# Patient Record
Sex: FEMALE | Race: BLACK OR AFRICAN AMERICAN | NOT HISPANIC OR LATINO | Employment: FULL TIME | URBAN - METROPOLITAN AREA
[De-identification: names, ages, dates, MRNs, and addresses within clinical notes are randomized per-mention and may not be internally consistent; named-entity substitution may affect disease eponyms.]

---

## 2022-11-18 ENCOUNTER — TELEPHONE (OUTPATIENT)
Dept: INTERNAL MEDICINE | Facility: CLINIC | Age: 32
End: 2022-11-18

## 2022-11-18 NOTE — TELEPHONE ENCOUNTER
Caller: Digna Ibarra    Relationship to patient: Self    Best call back number: [phone] (Home)    Patient directed to call 911 or go to their nearest emergency room.     Patient verbalized understanding: [x] Yes  [] No  If no, why?    Additional notes: PATIENT JUST CALLED REQUESTING A SAME DAY APPOINTMENT I ADVISED HER TO BE SEEN AT THE EMERGENCY ROOM HER SYMPTOMS ARE LEFT SIDE OF FACE NUMB, LEFT SIDE OF HEART PAIN, HEADACHE

## 2023-02-27 ENCOUNTER — TELEPHONE (OUTPATIENT)
Dept: INTERNAL MEDICINE | Facility: CLINIC | Age: 33
End: 2023-02-27

## 2023-02-27 NOTE — TELEPHONE ENCOUNTER
Caller: Digna Ibarra    Relationship to patient: Self    Best call back number: [phone]    Chief complaint: PATIENT CALLED STATING THAT SHE IS STILL HAVING ABDOMINAL PAIN AND FATIGUE. SHE STATES HER FATIGUE IS GETTING WORSE THEN IT WAS WHEN SHE WAS SEEN LAST WEEK, THAT WHEN IT HITS SHE HAS TO LAY DOWN. SHE IS ALSO EXPERIENCING LOSS OF VISION TODAY-IT WILL GO OUT AND THEN COME BACK IN OR IMAGES WILL BE EXTREMELY BLURRY AND THEN FIX ITSELF. PATIENT WAS ADVISED TO GO TO THE ER, PATIENT AGREED TO GO.     Patient directed to call 911 or go to their nearest emergency room.     Patient verbalized understanding: [x] Yes  [] No  If no, why?

## 2023-03-13 ENCOUNTER — TELEPHONE (OUTPATIENT)
Dept: NEUROLOGY | Facility: CLINIC | Age: 33
End: 2023-03-13

## 2023-03-13 NOTE — TELEPHONE ENCOUNTER
Provider: LIZA JOHNSON   Caller: AZEEM  Relationship to Patient: SELF   Phone Number: [phone]  Reason for Call: PATIENT CALLED STATES SHE HAD A CONSULT WITH LIZA WHILE IN THE HOSPITAL. AT THAT TIME LIZA GAVE PATIENT HER CARD AND TOLD HER SHE COULD CALL HER IF SHE HAD ANY CONCERNS OR WAS CONTINUING TO EXPERIENCE ANY CONCERNING SYMPTOMS. AT THIS TIME PATIENT IS REQUESTING A CALL BACK FROM LIZA AT HER EARLIEST CONVENIENCE     THANK YOU

## 2023-06-16 ENCOUNTER — TELEPHONE (OUTPATIENT)
Dept: NEUROLOGY | Facility: CLINIC | Age: 33
End: 2023-06-16

## 2023-06-16 NOTE — TELEPHONE ENCOUNTER
Provider: FADY  Caller: AZEEM  Phone Number: [phone]  Reason for Call: PT CALLED TO TRY TO GET A SOONER APPT TO SEE PROVIDER SOONER THAN 12/12/23 AS SHE IS HAVING MIGRAINE ATTACKS. PT STATES THAT SHE HAD BAD MIGRAINES THIS WEEK BUT DID NOT GO TO THE HOSPITAL. PT STATES THAT THE MIGRAINE MADE HER NECK AND LEFT SIDE OF BODY TINSE UP. PT STATES THIS LASTED FOR 2-3 HOURS UPON WAKE IT WAS GONE. PT WANTS TO KNOW IF THERE IS ANYTHING ELSE THAT SHE CAN TAKE OR DO FOR MIGRAINES. PT STATES THAT SHE STOPPED TAKING NAPROXEN DUE TO CHEST PAINS AND CAUSING MORE MIGRAINES AND PT IS AFRAID TO TAKE TOPAMAX AS SHE CAN NOT JUST STOP TAKING IT IF SHE HAS SIDE EFFECTS.    PLEASE REVIEW AND ADVISE.  THANK YOU

## 2023-06-19 NOTE — TELEPHONE ENCOUNTER
LVM FOR PT. SCHEDULED PT FOR 6/21/23 AT 1. DR. SHRESTHA HAD CANCELLATION. PLEASE LET PT KNOW WHEN SHE CALLS BACK IF THIS DOES NOT WORK PLEASE LET ME KNOW AND I WILL LOOK FOR ANOTHER APT. FOR SOONER. KEPT THE DECEMBER APT FOR NOW. THANK YOU.

## 2023-06-21 NOTE — TELEPHONE ENCOUNTER
Caller: Digna Ibarra    Relationship: Self    Best call back number: [phone]    What is the best time to reach you: ANY    Who are you requesting to speak with (clinical staff, provider,  specific staff member): BARI    Do you know the name of the person who called: BARI    What was the call regarding: UNABLE TO MAKE APPT ON 6/21/23 DUE TO WORK. WOULD LIKE TO SEE ABOUT ANOTHER APPT.    HUB CANCELED 6/21/23 & LEFT APPT FOR DECEMBER.

## 2023-10-23 DIAGNOSIS — R07.9 CHEST PAIN DUE TO GERD: Primary | ICD-10-CM

## 2023-10-23 DIAGNOSIS — K21.9 CHEST PAIN DUE TO GERD: Primary | ICD-10-CM

## 2023-11-16 ENCOUNTER — OFFICE VISIT (OUTPATIENT)
Dept: FAMILY MEDICINE CLINIC | Facility: CLINIC | Age: 33
End: 2023-11-16
Payer: COMMERCIAL

## 2023-11-16 VITALS
BODY MASS INDEX: 24.63 KG/M2 | DIASTOLIC BLOOD PRESSURE: 68 MMHG | SYSTOLIC BLOOD PRESSURE: 112 MMHG | HEIGHT: 63 IN | HEART RATE: 71 BPM | OXYGEN SATURATION: 95 % | WEIGHT: 139 LBS | TEMPERATURE: 98.2 F

## 2023-11-16 DIAGNOSIS — R53.83 OTHER FATIGUE: ICD-10-CM

## 2023-11-16 DIAGNOSIS — R42 DIZZINESS: ICD-10-CM

## 2023-11-16 DIAGNOSIS — E55.9 VITAMIN D DEFICIENCY: ICD-10-CM

## 2023-11-16 DIAGNOSIS — Z13.220 SCREENING CHOLESTEROL LEVEL: ICD-10-CM

## 2023-11-16 DIAGNOSIS — Z00.00 ROUTINE GENERAL MEDICAL EXAMINATION AT A HEALTH CARE FACILITY: Primary | ICD-10-CM

## 2023-11-16 DIAGNOSIS — R07.9 CHEST PAIN, UNSPECIFIED TYPE: ICD-10-CM

## 2023-11-16 DIAGNOSIS — L98.9 SCALP LESION: ICD-10-CM

## 2023-11-16 PROCEDURE — 99395 PREV VISIT EST AGE 18-39: CPT | Performed by: FAMILY MEDICINE

## 2023-11-16 PROCEDURE — 93000 ELECTROCARDIOGRAM COMPLETE: CPT | Performed by: FAMILY MEDICINE

## 2023-11-16 NOTE — PROGRESS NOTES
Chief Complaint  physial    Subjective            Digna Valerio presents to [facility]  History of Present Illness    Patient reporting that health is doing well overall.  Patient is here today for annual physical.  Trying to eat a balanced diet. States not getting to exercise much at all.    States she has notice some chills, dizziness,and fatigue.   Went to clinic two days ago really bad, state felt like going to pass out.   Still feeling bad but not as bad as Tuesday.    No sick contacts.     States still having some breast pain, states comes randomly throughout the day. States not associated with exercise or activity.  Just had a pain while driving to appt today. No shortness of breath has not used albuterol since September.           Labs: Due for routine labs    Colorectal cancer screening:not due at this time   Breast cancer screening: not due at this time   Cervical cancer screening: up to date.     Immunization History   Administered Date(s) Administered    COVID-19 (PFIZER) Purple Cap Monovalent 10/27/2021, 11/19/2021      Patient has seen dentist within last 6 months  needs to make appt   Patient has seen eye specialist within last 6 months need to schedule, vision fine    PHQ-2 Depression Screening  Little interest or pleasure in doing things? 0-->not at all   Feeling down, depressed, or hopeless? 0-->not at all   PHQ-2 Total Score 0      Social History     Socioeconomic History    Marital status:     Number of children: 0   Tobacco Use    Smoking status: Never    Smokeless tobacco: Never    Tobacco comments:     Caffeine use minimal   Vaping Use    Vaping Use: Never used   Substance and Sexual Activity    Alcohol use: Never    Drug use: Defer    Sexual activity: Defer          Review of Systems   Constitutional:  Positive for chills and fatigue.   HENT:  Negative for congestion, rhinorrhea and sneezing.    Respiratory:  Negative for shortness of breath and  "wheezing.         Chest pain, breast pain   Cardiovascular:  Negative for chest pain, palpitations and leg swelling.   Gastrointestinal:  Positive for abdominal pain. Negative for constipation, diarrhea, nausea and vomiting.   Genitourinary:  Negative for difficulty urinating.   Musculoskeletal: Negative.    Skin:         Soft, nontender lesion scalp close to base of head   Neurological:  Positive for dizziness.         Objective   Vital Signs:   /68   Pulse 71   Temp 98.2 °F (36.8 °C)   Ht 160 cm (63\")   Wt 63 kg (139 lb)   SpO2 95%   BMI 24.62 kg/m²     Physical Exam  Constitutional:       General: She is not in acute distress.  HENT:      Head: Normocephalic.      Right Ear: Tympanic membrane normal.      Left Ear: Tympanic membrane normal.      Mouth/Throat:      Mouth: Mucous membranes are moist.   Eyes:      Conjunctiva/sclera: Conjunctivae normal.   Cardiovascular:      Rate and Rhythm: Regular rhythm.      Heart sounds: Normal heart sounds.   Pulmonary:      Breath sounds: Normal breath sounds. No wheezing.   Abdominal:      Palpations: Abdomen is soft.      Tenderness: There is no abdominal tenderness.   Musculoskeletal:      Right lower leg: No edema.      Left lower leg: No edema.   Skin:     Comments: Soft, nontender lesion scalp close to base of head   Neurological:      Mental Status: She is alert and oriented to person, place, and time.   Psychiatric:         Mood and Affect: Mood normal.        Result Review :   The following data was reviewed by: Tram Gustafson MD on 11/16/2023:  Common labs          4/2/2023    03:04 5/25/2023    09:43 5/25/2023    11:13 6/7/2023    09:05   Common Labs   Glucose 92  78  118  87    BUN 12  8  7  12    Creatinine 0.74  0.67  0.68  0.67    Sodium 140  138  137  139    Potassium 4.1  4.0  4.5  4.2    Chloride 106  106  107  106    Calcium 9.4  9.1  8.8  9.5    Albumin 4.0  4.0  3.8  4.3    Total Bilirubin 0.2  0.3  0.3  0.4    Alkaline Phosphatase 63  62  " 59  60    AST (SGOT) 17  21  21  15    ALT (SGPT) 14  21  22  11    WBC 6.19  5.06  5.85  6.70    Hemoglobin 12.3  12.5  11.8  12.5    Hematocrit 36.3  38.8  36.3  38.3    Platelets 212  208  203  199           ECG 12 Lead    Date/Time: 11/16/2023 4:38 PM  Performed by: Tram Gustafson MD    Authorized by: Tram Gustafson MD  Comparison: compared with previous ECG from 7/20/2023  Similar to previous ECG  Comparison to previous ECG: Sinus arrhythmia  Rhythm: sinus arrhythmia  Rate: normal  BPM: 69  Comments: Borderline, similar to previous.              Current Outpatient Medications:     albuterol sulfate  (90 Base) MCG/ACT inhaler, INHALE 2 PUFFS BY MOUTH EVERY 4 HOURS AS NEEDED FOR WHEEZING FOR SHORTNESS OF BREATH, Disp: , Rfl:     ipratropium-albuterol (DUO-NEB) 0.5-2.5 mg/3 ml nebulizer, USE 1 VIAL IN NEBULIZER EVERY 6 HOURS AS NEEDED FOR WHEEZING FOR SHORTNESS OF BREATH, Disp: , Rfl:           Assessment and Plan    Diagnoses and all orders for this visit:    1. Routine general medical examination at a health care facility (Primary)    2. Vitamin D deficiency  -     Vitamin D,25-Hydroxy; Future    3. Dizziness  -     Comprehensive metabolic panel; Future  -     TSH Rfx On Abnormal To Free T4; Future  -     CBC & Differential; Future    4. Other fatigue  -     Comprehensive metabolic panel; Future  -     TSH Rfx On Abnormal To Free T4; Future  -     CBC & Differential; Future    5. Screening cholesterol level  -     Lipid panel; Future    6. Scalp lesion  -     Ambulatory Referral to Dermatology    7. Chest pain, unspecified type  -     ECG 12 Lead      Advise if worsening symptoms seek medical attention.         The patient was counseled regarding nutrition, physical activity, healthy weight, injury prevention, misuse of tobacco, alcohol and illicit drugs, mental health, immunizations, and screenings.      Follow Up   No follow-ups on file.  Patient was given instructions and counseling regarding her  condition or for health maintenance advice. Please see specific information pulled into the AVS if appropriate.

## 2023-12-04 ENCOUNTER — OFFICE VISIT (OUTPATIENT)
Dept: GASTROENTEROLOGY | Facility: CLINIC | Age: 33
End: 2023-12-04
Payer: COMMERCIAL

## 2023-12-04 ENCOUNTER — PREP FOR SURGERY (OUTPATIENT)
Dept: SURGERY | Facility: SURGERY CENTER | Age: 33
End: 2023-12-04
Payer: COMMERCIAL

## 2023-12-04 VITALS
HEIGHT: 63 IN | OXYGEN SATURATION: 99 % | BODY MASS INDEX: 24.38 KG/M2 | DIASTOLIC BLOOD PRESSURE: 64 MMHG | SYSTOLIC BLOOD PRESSURE: 98 MMHG | HEART RATE: 77 BPM | WEIGHT: 137.6 LBS | TEMPERATURE: 98.7 F

## 2023-12-04 DIAGNOSIS — K21.9 GASTROESOPHAGEAL REFLUX DISEASE, UNSPECIFIED WHETHER ESOPHAGITIS PRESENT: Primary | ICD-10-CM

## 2023-12-04 DIAGNOSIS — R11.0 NAUSEA: ICD-10-CM

## 2023-12-04 DIAGNOSIS — R10.13 EPIGASTRIC PAIN: ICD-10-CM

## 2023-12-04 DIAGNOSIS — R07.89 ATYPICAL CHEST PAIN: ICD-10-CM

## 2023-12-04 RX ORDER — SODIUM CHLORIDE 0.9 % (FLUSH) 0.9 %
3 SYRINGE (ML) INJECTION EVERY 12 HOURS SCHEDULED
OUTPATIENT
Start: 2023-12-04

## 2023-12-04 RX ORDER — SODIUM CHLORIDE, SODIUM LACTATE, POTASSIUM CHLORIDE, CALCIUM CHLORIDE 600; 310; 30; 20 MG/100ML; MG/100ML; MG/100ML; MG/100ML
30 INJECTION, SOLUTION INTRAVENOUS CONTINUOUS PRN
OUTPATIENT
Start: 2023-12-04

## 2023-12-04 RX ORDER — SODIUM CHLORIDE 0.9 % (FLUSH) 0.9 %
10 SYRINGE (ML) INJECTION AS NEEDED
OUTPATIENT
Start: 2023-12-04

## 2023-12-04 NOTE — PROGRESS NOTES
"Chief Complaint   Patient presents with    Chest Pain         History of Present Illness  Patient is a 32-year-old female who presents today for evaluation. She was referred for chest pain due to GERD.    Patient presents today for evaluation with concerns about chest pain.  Reports this has been occurring intermittently over the last year.  She has had a cardiac workup and mammogram both of which were negative.    Reports she also experiences epigastric burning.  This comes and goes.  She has noticed that eating spicy cheese can make this worse.  She has had some nausea but denies any vomiting.    She tried acid reducers however these did not eliminate her symptoms.    Reports intermittent issues with constipation.  Denies any blood in the stool or melena.    She was adopted and does not know her family history.     Result Review :       Comprehensive Metabolic Panel (06/07/2023 09:05)    CBC & Differential (06/07/2023 09:05)    XR Abdomen Flat And Upright (07/18/2023 20:13)    Referral to Gastroenterology for Chest pain due to GERD (10/23/2023)    Vital Signs:   BP 98/64   Pulse 77   Temp 98.7 °F (37.1 °C)   Ht 160 cm (63\")   Wt 62.4 kg (137 lb 9.6 oz)   SpO2 99%   BMI 24.37 kg/m²     Body mass index is 24.37 kg/m².     Physical Exam  Vitals reviewed.   Constitutional:       General: She is not in acute distress.     Appearance: She is well-developed.   HENT:      Head: Normocephalic and atraumatic.   Pulmonary:      Effort: Pulmonary effort is normal. No respiratory distress.   Abdominal:      General: Abdomen is flat. Bowel sounds are normal. There is no distension.      Palpations: Abdomen is soft.      Tenderness: There is abdominal tenderness in the right upper quadrant and left upper quadrant.   Skin:     General: Skin is dry.      Coloration: Skin is not pale.   Neurological:      Mental Status: She is alert and oriented to person, place, and time.   Psychiatric:         Thought Content: Thought " content normal.           Assessment and Plan    Diagnoses and all orders for this visit:    1. Gastroesophageal reflux disease, unspecified whether esophagitis present (Primary)    2. Epigastric pain  -     US Abdomen Limited; Future    3. Atypical chest pain  -     US Abdomen Limited; Future    4. Nausea  -     US Abdomen Limited; Future         Discussion  Patient presents today for evaluation with concerns about nausea, atypical chest pain, and epigastric pain.  Recommended right upper quadrant ultrasound to evaluate the gallbladder and recommended EGD to assess for any evidence of esophagitis, gastritis, peptic ulcer disease, or hiatal hernia that could be contributing to symptoms.  Reviewed dietary modifications to help with reflux at today's office visit.          Follow Up   Return for Follow up to review results after testing complete.    Patient Instructions   Schedule EGD for further evaluation of symptoms.     Schedule RUQ ultrasound to evaluate the gallbladder.     For GERD, follow antireflux precautions.  Recommend avoiding eating within 3 to 4 hours of bedtime.  Avoid foods that can trigger symptoms which may include citrus fruits, spicy foods, tomatoes and red sauces, chocolate, coffee/tea, caffeinated or carbonated beverages, alcohol.

## 2023-12-04 NOTE — PATIENT INSTRUCTIONS
Schedule EGD for further evaluation of symptoms.     Schedule RUQ ultrasound to evaluate the gallbladder.     For GERD, follow antireflux precautions.  Recommend avoiding eating within 3 to 4 hours of bedtime.  Avoid foods that can trigger symptoms which may include citrus fruits, spicy foods, tomatoes and red sauces, chocolate, coffee/tea, caffeinated or carbonated beverages, alcohol.

## 2023-12-05 ENCOUNTER — TELEPHONE (OUTPATIENT)
Dept: GASTROENTEROLOGY | Facility: CLINIC | Age: 33
End: 2023-12-05
Payer: COMMERCIAL

## 2023-12-06 ENCOUNTER — TELEPHONE (OUTPATIENT)
Dept: GASTROENTEROLOGY | Facility: CLINIC | Age: 33
End: 2023-12-06
Payer: COMMERCIAL

## 2023-12-06 NOTE — TELEPHONE ENCOUNTER
Hub staff attempted to follow warm transfer process and was unsuccessful     Caller: Digna Oneill    Relationship to patient: Self    Best call back number: [phone]    Patient is needing: PATIENT NEEDING TO SCHEDULE SCOPE.  PLEASE REACH OUT TO SCHEDULE. THANK YOU

## 2023-12-12 ENCOUNTER — OFFICE VISIT (OUTPATIENT)
Dept: NEUROLOGY | Facility: CLINIC | Age: 33
End: 2023-12-12
Payer: COMMERCIAL

## 2023-12-12 ENCOUNTER — HOSPITAL ENCOUNTER (OUTPATIENT)
Dept: ULTRASOUND IMAGING | Facility: HOSPITAL | Age: 33
Discharge: HOME | End: 2023-12-12
Admitting: NURSE PRACTITIONER
Payer: COMMERCIAL

## 2023-12-12 ENCOUNTER — PATIENT ROUNDING (BHMG ONLY) (OUTPATIENT)
Dept: FAMILY MEDICINE CLINIC | Facility: CLINIC | Age: 33
End: 2023-12-12
Payer: COMMERCIAL

## 2023-12-12 ENCOUNTER — OFFICE VISIT (OUTPATIENT)
Dept: FAMILY MEDICINE CLINIC | Facility: CLINIC | Age: 33
End: 2023-12-12
Payer: COMMERCIAL

## 2023-12-12 VITALS
BODY MASS INDEX: 24.08 KG/M2 | SYSTOLIC BLOOD PRESSURE: 106 MMHG | HEIGHT: 63 IN | DIASTOLIC BLOOD PRESSURE: 68 MMHG | RESPIRATION RATE: 14 BRPM | HEART RATE: 78 BPM | OXYGEN SATURATION: 98 % | WEIGHT: 135.9 LBS

## 2023-12-12 VITALS
SYSTOLIC BLOOD PRESSURE: 110 MMHG | HEIGHT: 63 IN | HEART RATE: 97 BPM | BODY MASS INDEX: 23.92 KG/M2 | OXYGEN SATURATION: 100 % | WEIGHT: 135 LBS | DIASTOLIC BLOOD PRESSURE: 72 MMHG

## 2023-12-12 DIAGNOSIS — R53.83 OTHER FATIGUE: ICD-10-CM

## 2023-12-12 DIAGNOSIS — E55.9 VITAMIN D DEFICIENCY: Primary | ICD-10-CM

## 2023-12-12 DIAGNOSIS — R11.0 NAUSEA: ICD-10-CM

## 2023-12-12 DIAGNOSIS — R10.13 EPIGASTRIC PAIN: ICD-10-CM

## 2023-12-12 DIAGNOSIS — G43.009 MIGRAINE WITHOUT AURA AND WITHOUT STATUS MIGRAINOSUS, NOT INTRACTABLE: Primary | ICD-10-CM

## 2023-12-12 DIAGNOSIS — J45.909 UNCOMPLICATED ASTHMA, UNSPECIFIED ASTHMA SEVERITY, UNSPECIFIED WHETHER PERSISTENT: ICD-10-CM

## 2023-12-12 DIAGNOSIS — Z13.220 SCREENING CHOLESTEROL LEVEL: ICD-10-CM

## 2023-12-12 DIAGNOSIS — R20.2 PARESTHESIAS: ICD-10-CM

## 2023-12-12 DIAGNOSIS — R07.89 ATYPICAL CHEST PAIN: ICD-10-CM

## 2023-12-12 PROCEDURE — 99213 OFFICE O/P EST LOW 20 MIN: CPT | Performed by: PSYCHIATRY & NEUROLOGY

## 2023-12-12 PROCEDURE — 76705 ECHO EXAM OF ABDOMEN: CPT

## 2023-12-12 RX ORDER — PANTOPRAZOLE SODIUM 40 MG/1
40 TABLET, DELAYED RELEASE ORAL DAILY
COMMUNITY
End: 2023-12-15 | Stop reason: SDUPTHER

## 2023-12-12 RX ORDER — THIAMINE HCL 100 MG
1 TABLET ORAL DAILY
Qty: 30 TABLET | Refills: 3 | Status: SHIPPED | OUTPATIENT
Start: 2023-12-12 | End: 2024-01-11

## 2023-12-12 NOTE — PROGRESS NOTES
Chief complaint: History of migraine headaches    Patient ID: Digna Valerio is a 32 y.o. female.    HPI: I had the pleasure of seeing your patient today.  As you may know she is a 32-year-old female here for the management of migraine headaches.  She has not had any significant headaches with pain.  She does mention some symptoms of tingling of the scalp.  She has been taking oral B12 supplementation.  The symptoms are intermittent and occur around her cycle as her migraines do.  She says that her migraine headache frequency has improved from a pain standpoint.  She never did start taking the topiramate.  The episodes with paresthesias of the head typically occur with some foggy headedness.  No focal weakness or numbness of her arms or legs.  No double vision.    The following portions of the patient's history were reviewed and updated as appropriate: allergies, current medications, past family history, past medical history, past social history, past surgical history and problem list.    Review of Systems   Neurological:  Negative for dizziness, tremors, seizures, syncope, facial asymmetry, speech difficulty, light-headedness, numbness and headaches.   Psychiatric/Behavioral:  Negative for agitation, behavioral problems, confusion, decreased concentration, dysphoric mood, hallucinations, self-injury, sleep disturbance and suicidal ideas. The patient is not nervous/anxious and is not hyperactive.       I have reviewed the review of systems above performed by my medical assistant.      Vitals:    12/12/23 1345   BP: 110/72   Pulse: 97   SpO2: 100%       Neurologic Exam     Mental Status   Oriented to person, place, and time.   Concentration: normal.   Level of consciousness: alert  Knowledge: consistent with education (No deficits found.).     Cranial Nerves     CN II   Visual fields full to confrontation.     CN III, IV, VI   Pupils are equal, round, and reactive to light.  Extraocular motions are normal.    CN III: no CN III palsy  CN VI: no CN VI palsy    CN V   Facial sensation intact.     CN VII   Facial expression full, symmetric.     CN VIII   CN VIII normal.     CN IX, X   CN IX normal.   CN X normal.     CN XI   CN XI normal.     CN XII   CN XII normal.     Motor Exam     Strength   Right neck flexion: 5/5  Left neck flexion: 5/5  Right neck extension: 5/5  Left neck extension: 5/5  Right deltoid: 5/5  Left deltoid: 5/5  Right biceps: 5/5  Left biceps: 5/5  Right triceps: 5/5  Left triceps: 5/5  Right wrist flexion: 5/5  Left wrist flexion: 5/5  Right wrist extension: 5/5  Left wrist extension: 5/5  Right interossei: 5/5  Left interossei: 5/5  Right abdominals: 5/5  Left abdominals: 5/5  Right iliopsoas: 5/5  Left iliopsoas: 5/5  Right quadriceps: 5/5  Left quadriceps: 5/5  Right hamstrin/5  Left hamstrin/5  Right glutei: 5/5  Left glutei: 5/5  Right anterior tibial: 5/5  Left anterior tibial: 5/5  Right posterior tibial: 5/5  Left posterior tibial: 5/5  Right peroneal: 5/5  Left peroneal: 5/5  Right gastroc: 5/5  Left gastroc: 5/5    Sensory Exam   Light touch normal.   Vibration normal.     Gait, Coordination, and Reflexes     Gait  Gait: normal    Reflexes   Right brachioradialis: 2+  Left brachioradialis: 2+  Right biceps: 2+  Left biceps: 2+  Right triceps: 2+  Left triceps: 2+  Right patellar: 2+  Left patellar: 2+  Right achilles: 2+  Left achilles: 2+  Right : 2+  Left : 2+Station is normal.       Physical Exam  Vitals reviewed.   Constitutional:       Appearance: She is well-developed.   HENT:      Head: Normocephalic and atraumatic.   Eyes:      Extraocular Movements: EOM normal.      Pupils: Pupils are equal, round, and reactive to light.   Cardiovascular:      Rate and Rhythm: Normal rate and regular rhythm.   Pulmonary:      Breath sounds: Normal breath sounds.   Musculoskeletal:         General: Normal range of motion.   Skin:     General: Skin is warm.   Neurological:      Mental  Status: She is oriented to person, place, and time.      Gait: Gait is intact.      Deep Tendon Reflexes:      Reflex Scores:       Tricep reflexes are 2+ on the right side and 2+ on the left side.       Bicep reflexes are 2+ on the right side and 2+ on the left side.       Brachioradialis reflexes are 2+ on the right side and 2+ on the left side.       Patellar reflexes are 2+ on the right side and 2+ on the left side.       Achilles reflexes are 2+ on the right side and 2+ on the left side.        Procedures    Assessment/Plan: The symptoms of foggy headedness with paresthesias of the scalp could be related to vitamin B12 deficiency.  We have decided to forego lab testing for that today.  We are going to start her on magnesium and vitamin B2 seeing how she is not wanting to start the topiramate as preventative therapy for migraine headache.  We will see her back in 6 months or sooner if needed.  A total of 25 minutes was spent face-to-face with the patient today.  Of that greater than 50% of this time was spent discussing signs and symptoms of migraine headache, patient education, plan of care and prognosis.         Diagnoses and all orders for this visit:    1. Migraine without aura and without status migrainosus, not intractable (Primary)  -     Riboflavin (B-2-400) 400 MG capsule; Take 1 capsule by mouth Daily for 30 days.  Dispense: 30 capsule; Refill: 3  -     Magnesium 500 MG tablet; Take 1 tablet by mouth Daily for 30 days.  Dispense: 30 tablet; Refill: 3    2. Paresthesias           Juan Matamoros II, MD

## 2023-12-12 NOTE — PROGRESS NOTES
"Chief Complaint  Establish Care (Offboarding from [employer], pt wants to discuss doing followup regarding visit on thanksgiving from , pt wants to get labs done as well, pt needs refill on acid reflux medication pantoprazole. )    Subjective        Digna Valerio presents to [facility]  History of Present Illness    Establish medical care  PMH GERD on pantoprazole, following GI, following neurology for complicated migraine. Constipation  Had bowel movement today, two times, formed stool.   Pt has appointment with dermatology next year.  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    Weight as of this encounter: 61.6 kg (135 lb 14.4 oz).       BMI is within normal parameters. No other follow-up for BMI required.      Physical Exam  HENT:      Head: Normocephalic and atraumatic.      Mouth/Throat:      Mouth: Mucous membranes are moist.      Pharynx: Oropharynx is clear.   Eyes:      Extraocular Movements: Extraocular movements intact.      Conjunctiva/sclera: Conjunctivae normal.      Pupils: Pupils are equal, round, and reactive to light.   Cardiovascular:      Rate and Rhythm: Normal rate and regular rhythm.   Pulmonary:      Effort: Pulmonary effort is normal.      Breath sounds: Normal breath sounds.   Abdominal:      General: Bowel sounds are normal.      Palpations: Abdomen is soft.   Musculoskeletal:         General: Normal range of motion.      Cervical back: Neck supple.   Skin:     General: Skin is warm.      Capillary Refill: Capillary refill takes less than 2 seconds.   Neurological:      " General: No focal deficit present.      Mental Status: She is alert and oriented to person, place, and time. Mental status is at baseline.   Psychiatric:         Mood and Affect: Mood normal.        Result Review :  The following data was reviewed by: Vania Sherwood MD on 12/12/2023:  CMP          5/25/2023    09:43 5/25/2023    11:13 6/7/2023    09:05 12/15/2023    10:40   CMP   Glucose 78  118  87  80    BUN 8  7  12  7    Creatinine 0.67  0.68  0.67  0.75    EGFR 120.8  120.3  120.8     Sodium 138  137  139  137    Potassium 4.0  4.5  4.2  4.3    Chloride 106  107  106  106    Calcium 9.1  8.8  9.5  9.2    Total Protein    7.1    Total Protein 6.7  6.4  7.4     Albumin 4.0  3.8  4.3  4.3    Globulin    2.8    Globulin 2.7  2.6  3.1     Total Bilirubin 0.3  0.3  0.4  0.5    Alkaline Phosphatase 62  59  60  73    AST (SGOT) 21  21  15  14    ALT (SGPT) 21  22  11  13    Albumin/Globulin Ratio 1.5  1.5  1.4     BUN/Creatinine Ratio 11.9  10.3  17.9  9.3    Anion Gap 8.7  7.0  8.9       CBC          5/25/2023    09:43 5/25/2023    11:13 6/7/2023    09:05 12/15/2023    10:40   CBC   WBC 5.06  5.85  6.70  7.06    RBC 4.07  3.77  4.06  4.07    Hemoglobin 12.5  11.8  12.5  12.7    Hematocrit 38.8  36.3  38.3  37.4    MCV 95.3  96.3  94.3  91.9    MCH 30.7  31.3  30.8  31.2    MCHC 32.2  32.5  32.6  34.0    RDW 12.0  12.2  12.3  12.2    Platelets 208  203  199  223      Lipid Panel          12/15/2023    10:40   Lipid Panel   Total Cholesterol 159    Triglycerides 28    HDL Cholesterol 71    VLDL Cholesterol 7    LDL Cholesterol  81      TSH          2/28/2023    17:41 3/30/2023    09:37 12/15/2023    10:40   TSH   TSH 0.941  1.040  0.673                   Assessment and Plan   Diagnoses and all orders for this visit:    1. Vitamin D deficiency (Primary)  -     CBC Auto Differential  -     Comprehensive Metabolic Panel  -     Lipid Panel With / Chol / HDL Ratio  -     TSH  -     Vitamin D,25-Hydroxy    2. Uncomplicated  asthma, unspecified asthma severity, unspecified whether persistent  -     CBC Auto Differential  -     Comprehensive Metabolic Panel  -     Lipid Panel With / Chol / HDL Ratio  -     TSH  -     Vitamin D,25-Hydroxy    3. Screening cholesterol level  -     CBC Auto Differential  -     Comprehensive Metabolic Panel  -     Lipid Panel With / Chol / HDL Ratio  -     TSH  -     Vitamin D,25-Hydroxy    4. Other fatigue  -     CBC Auto Differential  -     Comprehensive Metabolic Panel  -     Lipid Panel With / Chol / HDL Ratio  -     TSH  -     Vitamin D,25-Hydroxy    Other orders  -     CBC & Differential    # Establishing medical care  Labs reviewed  CBC showed lymphocyte count borderline low, will recommend repeating level in 6 months  Rest of labs okay  # GERD  Needed refill on Protonix, provided today  # Vitamin D deficiency  Continue over-the-counter vitamin D supplementation  Asthma  Controlled with as needed albuterol inhaler, continue same  RTC in 6 months to 1 year for lab follow-up       Follow Up   No follow-ups on file.  Patient was given instructions and counseling regarding her condition or for health maintenance advice. Please see specific information pulled into the AVS if appropriate.

## 2023-12-14 RX ORDER — PANTOPRAZOLE SODIUM 40 MG/1
40 TABLET, DELAYED RELEASE ORAL DAILY
Status: CANCELLED | OUTPATIENT
Start: 2023-12-14

## 2023-12-14 NOTE — TELEPHONE ENCOUNTER
Caller: Digna Oneill    Relationship: Self    Best call back number: [phone]     Requested Prescriptions:   Requested Prescriptions     Pending Prescriptions Disp Refills    pantoprazole (PROTONIX) 40 MG EC tablet       Sig: Take 1 tablet by mouth Daily.        Pharmacy where request should be sent: [facility] [address] [place] [address] [facility] [phone] [facility] [phone]      Last office visit with prescribing clinician: 12/12/2023   Last telemedicine visit with prescribing clinician: Visit date not found   Next office visit with prescribing clinician: 11/25/2024     Additional details provided by patient: PATIENT STATES SHE HAS 2 LEFT    Does the patient have less than a 3 day supply:  [x] Yes  [] No    Would you like a call back once the refill request has been completed: [] Yes [x] No    If the office needs to give you a call back, can they leave a voicemail: [] Yes [x] No    Valentino May Rep   12/14/23 10:48 EST

## 2023-12-14 NOTE — TELEPHONE ENCOUNTER
Rx Refill Note  Requested Prescriptions     Pending Prescriptions Disp Refills    pantoprazole (PROTONIX) 40 MG EC tablet       Sig: Take 1 tablet by mouth Daily.      Last office visit with prescribing clinician: 12/12/2023   Last telemedicine visit with prescribing clinician: Visit date not found   Next office visit with prescribing clinician: 11/25/2024                         Would you like a call back once the refill request has been completed: [] Yes [] No    If the office needs to give you a call back, can they leave a voicemail: [] Yes [] No    Valentino Nunez Rep  12/14/23, 13:07 EST

## 2023-12-15 RX ORDER — PANTOPRAZOLE SODIUM 40 MG/1
40 TABLET, DELAYED RELEASE ORAL DAILY
Qty: 90 TABLET | Refills: 3 | Status: SHIPPED | OUTPATIENT
Start: 2023-12-15

## 2023-12-15 NOTE — TELEPHONE ENCOUNTER
Rx Refill Note  Requested Prescriptions     Pending Prescriptions Disp Refills    pantoprazole (PROTONIX) 40 MG EC tablet       Sig: Take 1 tablet by mouth Daily.      Last office visit with prescribing clinician: 12/12/2023   Last telemedicine visit with prescribing clinician: Visit date not found   Next office visit with prescribing clinician: 11/25/2024                         Would you like a call back once the refill request has been completed: [] Yes [] No    If the office needs to give you a call back, can they leave a voicemail: [] Yes [] No    Valentino Nunez Rep  12/15/23, 10:51 EST

## 2023-12-16 LAB
25(OH)D3+25(OH)D2 SERPL-MCNC: 33.7 NG/ML (ref 30–100)
ALBUMIN SERPL-MCNC: 4.3 G/DL (ref 3.5–5.2)
ALBUMIN/GLOB SERPL: 1.5 G/DL
ALP SERPL-CCNC: 73 U/L (ref 39–117)
ALT SERPL-CCNC: 13 U/L (ref 1–33)
AST SERPL-CCNC: 14 U/L (ref 1–32)
BASOPHILS # BLD AUTO: 0.02 10*3/MM3 (ref 0–0.2)
BASOPHILS NFR BLD AUTO: 0.3 % (ref 0–1.5)
BILIRUB SERPL-MCNC: 0.5 MG/DL (ref 0–1.2)
BUN SERPL-MCNC: 7 MG/DL (ref 6–20)
BUN/CREAT SERPL: 9.3 (ref 7–25)
CALCIUM SERPL-MCNC: 9.2 MG/DL (ref 8.6–10.5)
CHLORIDE SERPL-SCNC: 106 MMOL/L (ref 98–107)
CHOLEST SERPL-MCNC: 159 MG/DL (ref 0–200)
CHOLEST/HDLC SERPL: 2.24 {RATIO}
CO2 SERPL-SCNC: 23.3 MMOL/L (ref 22–29)
CREAT SERPL-MCNC: 0.75 MG/DL (ref 0.57–1)
EGFRCR SERPLBLD CKD-EPI 2021: 109.3 ML/MIN/1.73
EOSINOPHIL # BLD AUTO: 0.23 10*3/MM3 (ref 0–0.4)
EOSINOPHIL NFR BLD AUTO: 3.3 % (ref 0.3–6.2)
ERYTHROCYTE [DISTWIDTH] IN BLOOD BY AUTOMATED COUNT: 12.2 % (ref 12.3–15.4)
GLOBULIN SER CALC-MCNC: 2.8 GM/DL
GLUCOSE SERPL-MCNC: 80 MG/DL (ref 65–99)
HCT VFR BLD AUTO: 37.4 % (ref 34–46.6)
HDLC SERPL-MCNC: 71 MG/DL (ref 40–60)
HGB BLD-MCNC: 12.7 G/DL (ref 12–15.9)
IMM GRANULOCYTES # BLD AUTO: 0.02 10*3/MM3 (ref 0–0.05)
IMM GRANULOCYTES NFR BLD AUTO: 0.3 % (ref 0–0.5)
LDLC SERPL CALC-MCNC: 81 MG/DL (ref 0–100)
LYMPHOCYTES # BLD AUTO: 1.31 10*3/MM3 (ref 0.7–3.1)
LYMPHOCYTES NFR BLD AUTO: 18.6 % (ref 19.6–45.3)
MCH RBC QN AUTO: 31.2 PG (ref 26.6–33)
MCHC RBC AUTO-ENTMCNC: 34 G/DL (ref 31.5–35.7)
MCV RBC AUTO: 91.9 FL (ref 79–97)
MONOCYTES # BLD AUTO: 0.43 10*3/MM3 (ref 0.1–0.9)
MONOCYTES NFR BLD AUTO: 6.1 % (ref 5–12)
NEUTROPHILS # BLD AUTO: 5.05 10*3/MM3 (ref 1.7–7)
NEUTROPHILS NFR BLD AUTO: 71.4 % (ref 42.7–76)
NRBC BLD AUTO-RTO: 0 /100 WBC (ref 0–0.2)
PLATELET # BLD AUTO: 223 10*3/MM3 (ref 140–450)
POTASSIUM SERPL-SCNC: 4.3 MMOL/L (ref 3.5–5.2)
PROT SERPL-MCNC: 7.1 G/DL (ref 6–8.5)
RBC # BLD AUTO: 4.07 10*6/MM3 (ref 3.77–5.28)
SODIUM SERPL-SCNC: 137 MMOL/L (ref 136–145)
TRIGL SERPL-MCNC: 28 MG/DL (ref 0–150)
TSH SERPL DL<=0.005 MIU/L-ACNC: 0.67 UIU/ML (ref 0.27–4.2)
VLDLC SERPL CALC-MCNC: 7 MG/DL (ref 5–40)
WBC # BLD AUTO: 7.06 10*3/MM3 (ref 3.4–10.8)

## 2023-12-21 DIAGNOSIS — K21.9 GASTROESOPHAGEAL REFLUX DISEASE, UNSPECIFIED WHETHER ESOPHAGITIS PRESENT: Primary | ICD-10-CM

## 2023-12-21 RX ORDER — PANTOPRAZOLE SODIUM 40 MG/1
40 TABLET, DELAYED RELEASE ORAL DAILY
Qty: 90 TABLET | Refills: 3 | Status: SHIPPED | OUTPATIENT
Start: 2023-12-21

## 2023-12-22 ENCOUNTER — PATIENT MESSAGE (OUTPATIENT)
Dept: FAMILY MEDICINE CLINIC | Facility: CLINIC | Age: 33
End: 2023-12-22
Payer: COMMERCIAL

## 2023-12-22 ENCOUNTER — HOSPITAL ENCOUNTER (OUTPATIENT)
Dept: PHYSICAL THERAPY | Facility: HOSPITAL | Age: 33
Setting detail: THERAPIES SERIES
Discharge: HOME | End: 2023-12-22
Payer: COMMERCIAL

## 2023-12-22 DIAGNOSIS — R42 VERTIGO: Primary | ICD-10-CM

## 2023-12-22 PROCEDURE — 97161 PT EVAL LOW COMPLEX 20 MIN: CPT

## 2023-12-22 NOTE — THERAPY DISCHARGE NOTE
Outpatient Physical Therapy Vestibular Initial Evaluation/Discharge  [facility]     Patient Name: Digna Valerio  : 1990  MRN: [id number]  Today's Date: 2023      Visit Date: 2023    Patient Active Problem List   Diagnosis    Hearing loss of left ear    Vitamin D deficiency    Routine adult health maintenance    Other constipation    Anxiety    Gastroesophageal reflux disease    Vision changes    Hot flashes    Ophthalmic migraine    Abnormal MRI, neck    Other hemorrhoids    Intractable migraine without aura and without status migrainosus    Dizziness    Uncomplicated asthma        Past Medical History:   Diagnosis Date    Acid reflux     Anxiety     Asthma     Chest pain     left side    HL (hearing loss)         Past Surgical History:   Procedure Laterality Date    WISDOM TOOTH EXTRACTION Bilateral        Visit Dx:     ICD-10-CM ICD-9-CM   1. Vertigo  R42 780.4        Patient History       Row Name 23 0900             History    Chief Complaint Dizziness  -      Date Current Problem(s) Began 23  date referral was placed  -      Brief Description of Current Complaint Pt. Presents to clinic for vestibular evaluation. Reports earlier in the year she was having dizzy spells accompanied with chest pains requiring frequent ED visits. One severe episode she was unable to get out of bed but has had much less severe symptoms since the summer. Cardiac workup was clear. She will intermittently feel lightheaded or dizzy but has not needed to got to ED, uncertain of triggering factors but possibly related to migraines. She has a history of migraines, often presents with tingling in scalp and fogginess and visual disturbances but no significant headache pain, more recent they do seem related to menstrual cycle before and after. She has been taking B12 for the migraines which seems to help with energy levels. Primary concern is symptoms recurring as she is still uncertain what  was causing symptoms.  -      Previous treatment for THIS PROBLEM Medication  B12, migraine relief supplement, magnesium  -      Patient/Caregiver Goals Know what to do to help the symptoms  -      Occupation/sports/leisure activities social service clinician at [employer]  -      What clinical tests have you had for this problem? MRI;CT scan  -      Results of Clinical Tests per patient report (-) for acute findings - diagnosed with complex migraines  -         Pain     What Performance Factors Make the Current Problem(s) WORSE? unknown  -         Fall Risk Assessment    Any falls in the past year: No  -         Services    Prior Rehab/Home Health Experiences No  -         Daily Activities    Primary Language English  -      Are you able to read Yes  -      Are you able to write Yes  -      How does patient learn best? Listening;Reading;Demonstration  -      Does patient have problems with the following? None  -      Barriers to learning None  -      Pt Participated in POC and Goals Yes  -                User Key  (r) = Recorded By, (t) = Taken By, (c) = Cosigned By      Initials Name Provider Type     Caroline Hartley, PT Physical Therapist                     Vestibular Eval       Row Name 12/22/23 0900             Occulomotor Exam Fixation Present    Occular ROM Normal  -      Spontaneous Nystagmus Absent  -      Gaze-induced Nystagmus Absent  -      Smooth Pursuit Bilateral:;Normal  -      Saccades Bilateral:;Intact  -      Convergence Normal  -         Vestibulo-Occular Reflex (VOR)    VOR to Fast Head Movement/Head Thrust Test Normal  -      VOR Cancellation Normal  -         Positional Testing    Positional Testing Without infrared goggles  -      Vertebrobasilar Artery Screen - Right Negative  -      Vertebrobasilar Artery Screen - Left Negative  -      [place]-Hallpike Right No nystagmus  -      [place]-Hallpike Left No nystagmus  -       Horizontal Roll Test Right No nystagmus  -      Horizontal Roll Test Left No nystagmus  -         General ROM    GENERAL ROM COMMENTS C-spine WNL  -                User Key  (r) = Recorded By, (t) = Taken By, (c) = Cosigned By      Initials Name Provider Type     Caroline Hartley, PT Physical Therapist                                Therapy Education  Education Details: Educated on PT role and POC; purpose of vestibular therapy including anatomy of vestibular system and 3 semi-circular canals; utilized model to enhance understanding. Discussed BPPV vs. Differential diagnoses.  Given: Symptoms/condition management  Program: New  How Provided: Verbal  Provided to: Patient  Level of Understanding: Verbalized                         PT OP Goals       Row Name 12/22/23 0900          PT Short Term Goals    STG 1 eval only  -               User Key  (r) = Recorded By, (t) = Taken By, (c) = Cosigned By      Initials Name Provider Type     Caroline Hartley, PT Physical Therapist                     PT Assessment/Plan       Row Name 12/22/23 0925          PT Assessment    Functional Limitations Impaired locomotion;Decreased safety during functional activities;Limitation in home management  -     Impairments Balance  -     Assessment Comments Digna Valerio is a 33 y.o. year-old female referred to physical therapy for vestibular evaluation. She presents with a stable clinical presentation. She has comorbidities of hearing loss in L ear  from Dengue viral infection, complex migraines and personal factors of severity of symptoms in summer requiring multiple ED visits that may affect her progress in the plan of care. Self scored disability measure of DHI was a 20/100 indicating mild handicap. She demonstrated unremarkable oculomotor exam, (-) BPPV screen, symptoms unprovoked during evaluation. Discussed differential diagnoses, will hold on PT and pt. May call if symptoms regress/return.  -     Please refer  to paper survey for additional self-reported information No  -MH     Rehab Potential Other (comment)  eval only  -MH     Patient/caregiver participated in establishment of treatment plan and goals Yes  -MH     Patient would benefit from skilled therapy intervention No  -MH        PT Plan    PT Frequency One time visit  -     Predicted Duration of Therapy Intervention (PT) eval only  -MH     Planned CPT's? PT RE-EVAL: 73492;PT THER PROC EA 15 MIN: 62001;PT THER ACT EA 15 MIN: 79759;PT MANUAL THERAPY EA 15 MIN: 44162;PT NEUROMUSC RE-EDUCATION EA 15 MIN: 33004;PT GAIT TRAINING EA 15 MIN: 63171;PT SELF CARE/HOME MGMT/TRAIN EA 15: 89003;PT CANALITH REPOSITIONIN;PT EVAL LOW COMPLEXITY: 32952  -     PT Plan Comments re-eval PRN  -               User Key  (r) = Recorded By, (t) = Taken By, (c) = Cosigned By      Initials Name Provider Type     Caroline Hartley, PT Physical Therapist                     Outcome Measure Options: Dizziness Handicap Inventory           Time Calculation:   Start Time: 0900  Stop Time: 0924  Time Calculation (min): 24 min  Untimed Charges  PT Eval/Re-eval Minutes: 24  Total Minutes  Untimed Charges Total Minutes: 24   Total Minutes: 24     Therapy Charges for Today       Code Description Service Date Service Provider Modifiers Qty    08188683427  PT EVAL LOW COMPLEXITY 2 2023 Caroline Hartley, PT GP 1            PT G-Codes  Outcome Measure Options: Dizziness Handicap Inventory              Caroline Hartley PT  2023

## 2024-01-10 ENCOUNTER — TELEPHONE (OUTPATIENT)
Dept: GASTROENTEROLOGY | Facility: CLINIC | Age: 34
End: 2024-01-10
Payer: COMMERCIAL

## 2024-01-10 NOTE — TELEPHONE ENCOUNTER
Patient needs to cancel procedure 01/12 and wants to reschedule.  Patient will call back today around 4 because she is at work currently

## 2024-01-15 ENCOUNTER — APPOINTMENT (OUTPATIENT)
Dept: GENERAL RADIOLOGY | Facility: HOSPITAL | Age: 34
End: 2024-01-15
Payer: COMMERCIAL

## 2024-01-15 ENCOUNTER — HOSPITAL ENCOUNTER (EMERGENCY)
Facility: HOSPITAL | Age: 34
Discharge: HOME | End: 2024-01-15
Attending: EMERGENCY MEDICINE
Payer: COMMERCIAL

## 2024-01-15 VITALS
WEIGHT: 135.8 LBS | OXYGEN SATURATION: 100 % | TEMPERATURE: 98 F | SYSTOLIC BLOOD PRESSURE: 103 MMHG | DIASTOLIC BLOOD PRESSURE: 73 MMHG | HEART RATE: 75 BPM | RESPIRATION RATE: 18 BRPM | BODY MASS INDEX: 24.06 KG/M2 | HEIGHT: 63 IN

## 2024-01-15 DIAGNOSIS — R07.89 ATYPICAL CHEST PAIN: Primary | ICD-10-CM

## 2024-01-15 DIAGNOSIS — R06.02 SHORTNESS OF BREATH: ICD-10-CM

## 2024-01-15 LAB
ALBUMIN SERPL-MCNC: 4.2 G/DL (ref 3.5–5.2)
ALBUMIN/GLOB SERPL: 1.8 G/DL
ALP SERPL-CCNC: 69 U/L (ref 39–117)
ALT SERPL W P-5'-P-CCNC: 9 U/L (ref 1–33)
ANION GAP SERPL CALCULATED.3IONS-SCNC: 8.1 MMOL/L (ref 5–15)
AST SERPL-CCNC: 13 U/L (ref 1–32)
BASOPHILS # BLD AUTO: 0.01 10*3/MM3 (ref 0–0.2)
BASOPHILS NFR BLD AUTO: 0.2 % (ref 0–1.5)
BILIRUB SERPL-MCNC: 0.4 MG/DL (ref 0–1.2)
BUN SERPL-MCNC: 10 MG/DL (ref 6–20)
BUN/CREAT SERPL: 13.2 (ref 7–25)
CALCIUM SPEC-SCNC: 9 MG/DL (ref 8.6–10.5)
CHLORIDE SERPL-SCNC: 104 MMOL/L (ref 98–107)
CO2 SERPL-SCNC: 24.9 MMOL/L (ref 22–29)
CREAT SERPL-MCNC: 0.76 MG/DL (ref 0.57–1)
D DIMER PPP FEU-MCNC: 0.2 MCGFEU/ML (ref 0–0.5)
DEPRECATED RDW RBC AUTO: 43 FL (ref 37–54)
EGFRCR SERPLBLD CKD-EPI 2021: 107.6 ML/MIN/1.73
EOSINOPHIL # BLD AUTO: 0.08 10*3/MM3 (ref 0–0.4)
EOSINOPHIL NFR BLD AUTO: 1.6 % (ref 0.3–6.2)
ERYTHROCYTE [DISTWIDTH] IN BLOOD BY AUTOMATED COUNT: 12.2 % (ref 12.3–15.4)
GEN 5 2HR TROPONIN T REFLEX: <6 NG/L
GLOBULIN UR ELPH-MCNC: 2.4 GM/DL
GLUCOSE SERPL-MCNC: 101 MG/DL (ref 65–99)
HCG SERPL QL: NEGATIVE
HCT VFR BLD AUTO: 38.1 % (ref 34–46.6)
HGB BLD-MCNC: 12.4 G/DL (ref 12–15.9)
HOLD SPECIMEN: NORMAL
HOLD SPECIMEN: NORMAL
IMM GRANULOCYTES # BLD AUTO: 0 10*3/MM3 (ref 0–0.05)
IMM GRANULOCYTES NFR BLD AUTO: 0 % (ref 0–0.5)
LYMPHOCYTES # BLD AUTO: 1.51 10*3/MM3 (ref 0.7–3.1)
LYMPHOCYTES NFR BLD AUTO: 29.3 % (ref 19.6–45.3)
MCH RBC QN AUTO: 30.6 PG (ref 26.6–33)
MCHC RBC AUTO-ENTMCNC: 32.5 G/DL (ref 31.5–35.7)
MCV RBC AUTO: 94.1 FL (ref 79–97)
MONOCYTES # BLD AUTO: 0.29 10*3/MM3 (ref 0.1–0.9)
MONOCYTES NFR BLD AUTO: 5.6 % (ref 5–12)
NEUTROPHILS NFR BLD AUTO: 3.26 10*3/MM3 (ref 1.7–7)
NEUTROPHILS NFR BLD AUTO: 63.3 % (ref 42.7–76)
PLATELET # BLD AUTO: 206 10*3/MM3 (ref 140–450)
PMV BLD AUTO: 9.4 FL (ref 6–12)
POTASSIUM SERPL-SCNC: 3.7 MMOL/L (ref 3.5–5.2)
PROT SERPL-MCNC: 6.6 G/DL (ref 6–8.5)
QT INTERVAL: 380 MS
QTC INTERVAL: 416 MS
RBC # BLD AUTO: 4.05 10*6/MM3 (ref 3.77–5.28)
SODIUM SERPL-SCNC: 137 MMOL/L (ref 136–145)
TROPONIN T DELTA: NORMAL
TROPONIN T SERPL HS-MCNC: <6 NG/L
WBC NRBC COR # BLD AUTO: 5.15 10*3/MM3 (ref 3.4–10.8)
WHOLE BLOOD HOLD COAG: NORMAL
WHOLE BLOOD HOLD SPECIMEN: NORMAL

## 2024-01-15 PROCEDURE — 36415 COLL VENOUS BLD VENIPUNCTURE: CPT

## 2024-01-15 PROCEDURE — 99284 EMERGENCY DEPT VISIT MOD MDM: CPT

## 2024-01-15 PROCEDURE — 99284 EMERGENCY DEPT VISIT MOD MDM: CPT | Performed by: PHYSICIAN ASSISTANT

## 2024-01-15 PROCEDURE — 85379 FIBRIN DEGRADATION QUANT: CPT | Performed by: PHYSICIAN ASSISTANT

## 2024-01-15 PROCEDURE — 93010 ELECTROCARDIOGRAM REPORT: CPT | Performed by: STUDENT IN AN ORGANIZED HEALTH CARE EDUCATION/TRAINING PROGRAM

## 2024-01-15 PROCEDURE — 84703 CHORIONIC GONADOTROPIN ASSAY: CPT | Performed by: PHYSICIAN ASSISTANT

## 2024-01-15 PROCEDURE — 85025 COMPLETE CBC W/AUTO DIFF WBC: CPT

## 2024-01-15 PROCEDURE — 71045 X-RAY EXAM CHEST 1 VIEW: CPT

## 2024-01-15 PROCEDURE — 84484 ASSAY OF TROPONIN QUANT: CPT

## 2024-01-15 PROCEDURE — 80053 COMPREHEN METABOLIC PANEL: CPT

## 2024-01-15 PROCEDURE — 93005 ELECTROCARDIOGRAM TRACING: CPT

## 2024-01-15 RX ORDER — HYDROXYZINE HYDROCHLORIDE 25 MG/1
25 TABLET, FILM COATED ORAL EVERY 6 HOURS PRN
Qty: 15 TABLET | Refills: 0 | Status: SHIPPED | OUTPATIENT
Start: 2024-01-15 | End: 2024-01-20

## 2024-01-15 RX ORDER — SODIUM CHLORIDE 0.9 % (FLUSH) 0.9 %
10 SYRINGE (ML) INJECTION AS NEEDED
Status: DISCONTINUED | OUTPATIENT
Start: 2024-01-15 | End: 2024-01-15 | Stop reason: HOSPADM

## 2024-01-15 RX ORDER — ASPIRIN 325 MG
325 TABLET ORAL ONCE
Status: DISCONTINUED | OUTPATIENT
Start: 2024-01-15 | End: 2024-01-15

## 2024-01-15 RX ORDER — LIDOCAINE HYDROCHLORIDE 20 MG/ML
5 SOLUTION OROPHARYNGEAL ONCE
Status: COMPLETED | OUTPATIENT
Start: 2024-01-15 | End: 2024-01-15

## 2024-01-15 RX ADMIN — LIDOCAINE HYDROCHLORIDE 5 ML: 20 SOLUTION ORAL at 11:28

## 2024-01-15 RX ADMIN — ALUMINUM HYDROXIDE, MAGNESIUM HYDROXIDE, AND DIMETHICONE: 400; 400; 40 SUSPENSION ORAL at 11:28

## 2024-01-15 NOTE — FSED PROVIDER NOTE
Subjective   History of Present Illness    Patient, history of complex migraines and GERD, reports that she woke up at 7:30 AM this morning with shortness of breath and left-sided chest pain.  She describes the chest pain as constant pressure.  Denies calf pain, lower extremity swelling, fever, body aches.    Patient reports that she has had the symptoms a few times last year and was seen at [facility] ER.  She says that her current symptoms very similar and thinks it is due to anxiety but always wants to get checked out to make sure.    Denies any hormone use, clotting disorder, recent prolonged mobilization.    In addition, patient developed a left-sided headache 2 days ago which resolved yesterday.  She reports that it is similar to her previous headaches.  Denies headache today.    Review of Systems   Constitutional:  Negative for activity change and appetite change.   Eyes:  Negative for pain.   Respiratory:  Positive for shortness of breath.    Cardiovascular:  Positive for chest pain.   Gastrointestinal:  Negative for nausea and vomiting.   Musculoskeletal:  Negative for arthralgias.   Skin:  Negative for color change.   Neurological:  Negative for dizziness.   All other systems reviewed and are negative.      Past Medical History:   Diagnosis Date    Acid reflux     Anxiety     Asthma     Chest pain     left side    HL (hearing loss)        No Known Allergies    Past Surgical History:   Procedure Laterality Date    WISDOM TOOTH EXTRACTION Bilateral        Family History   Adopted: Yes       Social History     Socioeconomic History    Marital status:     Number of children: 0   Tobacco Use    Smoking status: Never    Smokeless tobacco: Never    Tobacco comments:     Caffeine use minimal   Vaping Use    Vaping Use: Never used   Substance and Sexual Activity    Alcohol use: Never    Drug use: Defer    Sexual activity: Defer           Objective   Physical Exam  Vitals and nursing note reviewed.    Constitutional:       Appearance: Normal appearance. She is normal weight.   HENT:      Head: Normocephalic and atraumatic.      Nose: Nose normal.      Mouth/Throat:      Mouth: Mucous membranes are moist.   Eyes:      Pupils: Pupils are equal, round, and reactive to light.   Cardiovascular:      Rate and Rhythm: Normal rate and regular rhythm.      Pulses: Normal pulses.      Heart sounds: Normal heart sounds.      Comments: Nonreproducible chest discomfort  Pulmonary:      Effort: Pulmonary effort is normal.      Breath sounds: Normal breath sounds.   Musculoskeletal:         General: Normal range of motion.      Cervical back: Normal range of motion.      Right lower leg: No edema.      Left lower leg: No edema.      Comments: No calf pain, no lower extremity swelling   Skin:     General: Skin is warm.   Neurological:      General: No focal deficit present.      Mental Status: She is alert.   Psychiatric:         Behavior: Behavior is cooperative.         Procedures           ED Course  ED Course as of 01/15/24 1323   Mon Sg 15, 2024   1023 I reviewed old records.  Patient was seen at [facility] ER in May 2023 for chest pain and left-sided headache (here for the same symptoms).  Had unremarkable results at that time.    Patient does have a history of migraines and previous imagings has shown:    CTA images of the head and neck from 2/27/2023 which showed no significant stenosis.  MRI of the cervical spine and brain from 2/20/2023 showed no evidence of demyelinating process or acute infarct. [SS]   1028 WBC: 5.15 [SS]   1056 D-Dimer, Quant: 0.20 [SS]   1103 I rechecked patient and informed her of the unremarkable results.  I also informed patient that we will repeat troponin 2 hours from when the initial blood work was drawn. [SS]   1107 EKG          EKG time: 1020  Rhythm/Rate: Sinus arrhythmia; 72  P waves and NH: nml  QRS, axis: nml   ST and T waves: nml  [SS]      ED Course User Index  [SS]  Viviana Sharpe PA-C                HEART Score: 0                            Medical Decision Making  Problems Addressed:  Atypical chest pain: complicated acute illness or injury  Shortness of breath: complicated acute illness or injury    Amount and/or Complexity of Data Reviewed  Labs: ordered. Decision-making details documented in ED Course.  Radiology: ordered.  ECG/medicine tests: ordered.    Risk  OTC drugs.  Prescription drug management.        Final diagnoses:   Atypical chest pain   Shortness of breath       ED Disposition  ED Disposition       ED Disposition   Discharge    Condition   Stable    Comment   --               Vania Sherwood MD  [address] [id number] [phone]               Medication List        New Prescriptions      hydrOXYzine 25 MG tablet  Commonly known as: ATARAX  Take 1 tablet by mouth Every 6 (Six) Hours As Needed for Anxiety for up to 5 days.               Where to Get Your Medications        These medications were sent to [facility] [address] [place] [address] [facility] - [phone]  - [phone]   [address] [facility] [address]      Phone: [phone]   hydrOXYzine 25 MG tablet

## 2024-01-15 NOTE — ED TRIAGE NOTES
Pt via PV from with c/o waking up around 0730 with L sided CP that radiates to back, LUE, and L side of head, SOA.

## 2024-01-15 NOTE — DISCHARGE INSTRUCTIONS
Please take the medications as prescribed.  Light activity for the next few days.  Follow-up with your primary care doctor next week if your symptoms continue.

## 2024-02-09 ENCOUNTER — HOSPITAL ENCOUNTER (EMERGENCY)
Facility: HOSPITAL | Age: 34
Discharge: HOME | End: 2024-02-09
Attending: STUDENT IN AN ORGANIZED HEALTH CARE EDUCATION/TRAINING PROGRAM
Payer: COMMERCIAL

## 2024-02-09 VITALS
DIASTOLIC BLOOD PRESSURE: 82 MMHG | WEIGHT: 130 LBS | SYSTOLIC BLOOD PRESSURE: 114 MMHG | HEART RATE: 67 BPM | HEIGHT: 63 IN | RESPIRATION RATE: 16 BRPM | BODY MASS INDEX: 23.04 KG/M2 | TEMPERATURE: 98.2 F | OXYGEN SATURATION: 100 %

## 2024-02-09 DIAGNOSIS — R10.30 LOWER ABDOMINAL PAIN: Primary | ICD-10-CM

## 2024-02-09 DIAGNOSIS — R11.0 NAUSEA: ICD-10-CM

## 2024-02-09 LAB
ALBUMIN SERPL-MCNC: 4.2 G/DL (ref 3.5–5.2)
ALBUMIN/GLOB SERPL: 1.9 G/DL
ALP SERPL-CCNC: 70 U/L (ref 39–117)
ALT SERPL W P-5'-P-CCNC: 10 U/L (ref 1–33)
ANION GAP SERPL CALCULATED.3IONS-SCNC: 6.5 MMOL/L (ref 5–15)
AST SERPL-CCNC: 14 U/L (ref 1–32)
B-HCG UR QL: NEGATIVE
BACTERIA UR QL AUTO: NORMAL /HPF
BASOPHILS # BLD AUTO: 0.02 10*3/MM3 (ref 0–0.2)
BASOPHILS NFR BLD AUTO: 0.4 % (ref 0–1.5)
BILIRUB SERPL-MCNC: 0.3 MG/DL (ref 0–1.2)
BILIRUB UR QL STRIP: NEGATIVE
BUN SERPL-MCNC: 8 MG/DL (ref 6–20)
BUN/CREAT SERPL: 10.8 (ref 7–25)
CALCIUM SPEC-SCNC: 9 MG/DL (ref 8.6–10.5)
CHLORIDE SERPL-SCNC: 105 MMOL/L (ref 98–107)
CLARITY UR: CLEAR
CO2 SERPL-SCNC: 25.5 MMOL/L (ref 22–29)
COLOR UR: YELLOW
CREAT SERPL-MCNC: 0.74 MG/DL (ref 0.57–1)
DEPRECATED RDW RBC AUTO: 43 FL (ref 37–54)
EGFRCR SERPLBLD CKD-EPI 2021: 111.1 ML/MIN/1.73
EOSINOPHIL # BLD AUTO: 0.09 10*3/MM3 (ref 0–0.4)
EOSINOPHIL NFR BLD AUTO: 1.6 % (ref 0.3–6.2)
ERYTHROCYTE [DISTWIDTH] IN BLOOD BY AUTOMATED COUNT: 12.3 % (ref 12.3–15.4)
GLOBULIN UR ELPH-MCNC: 2.2 GM/DL
GLUCOSE SERPL-MCNC: 108 MG/DL (ref 65–99)
GLUCOSE UR STRIP-MCNC: NEGATIVE MG/DL
HCT VFR BLD AUTO: 34.5 % (ref 34–46.6)
HGB BLD-MCNC: 11.5 G/DL (ref 12–15.9)
HGB UR QL STRIP.AUTO: ABNORMAL
HOLD SPECIMEN: NORMAL
HYALINE CASTS UR QL AUTO: NORMAL /LPF
IMM GRANULOCYTES # BLD AUTO: 0 10*3/MM3 (ref 0–0.05)
IMM GRANULOCYTES NFR BLD AUTO: 0 % (ref 0–0.5)
KETONES UR QL STRIP: NEGATIVE
LEUKOCYTE ESTERASE UR QL STRIP.AUTO: NEGATIVE
LIPASE SERPL-CCNC: 48 U/L (ref 13–60)
LYMPHOCYTES # BLD AUTO: 1.94 10*3/MM3 (ref 0.7–3.1)
LYMPHOCYTES NFR BLD AUTO: 35.4 % (ref 19.6–45.3)
MCH RBC QN AUTO: 31.3 PG (ref 26.6–33)
MCHC RBC AUTO-ENTMCNC: 33.3 G/DL (ref 31.5–35.7)
MCV RBC AUTO: 94 FL (ref 79–97)
MONOCYTES # BLD AUTO: 0.49 10*3/MM3 (ref 0.1–0.9)
MONOCYTES NFR BLD AUTO: 8.9 % (ref 5–12)
NEUTROPHILS NFR BLD AUTO: 2.94 10*3/MM3 (ref 1.7–7)
NEUTROPHILS NFR BLD AUTO: 53.7 % (ref 42.7–76)
NITRITE UR QL STRIP: NEGATIVE
PH UR STRIP.AUTO: 7 [PH] (ref 5–8)
PLATELET # BLD AUTO: 195 10*3/MM3 (ref 140–450)
PMV BLD AUTO: 9.7 FL (ref 6–12)
POTASSIUM SERPL-SCNC: 3.6 MMOL/L (ref 3.5–5.2)
PROT SERPL-MCNC: 6.4 G/DL (ref 6–8.5)
PROT UR QL STRIP: NEGATIVE
RBC # BLD AUTO: 3.67 10*6/MM3 (ref 3.77–5.28)
RBC # UR STRIP: NORMAL /HPF
REF LAB TEST METHOD: NORMAL
SODIUM SERPL-SCNC: 137 MMOL/L (ref 136–145)
SP GR UR STRIP: 1.01 (ref 1–1.03)
SQUAMOUS #/AREA URNS HPF: NORMAL /HPF
UROBILINOGEN UR QL STRIP: ABNORMAL
WBC # UR STRIP: NORMAL /HPF
WBC NRBC COR # BLD AUTO: 5.48 10*3/MM3 (ref 3.4–10.8)

## 2024-02-09 PROCEDURE — 83690 ASSAY OF LIPASE: CPT | Performed by: STUDENT IN AN ORGANIZED HEALTH CARE EDUCATION/TRAINING PROGRAM

## 2024-02-09 PROCEDURE — 81001 URINALYSIS AUTO W/SCOPE: CPT | Performed by: STUDENT IN AN ORGANIZED HEALTH CARE EDUCATION/TRAINING PROGRAM

## 2024-02-09 PROCEDURE — 81025 URINE PREGNANCY TEST: CPT | Performed by: STUDENT IN AN ORGANIZED HEALTH CARE EDUCATION/TRAINING PROGRAM

## 2024-02-09 PROCEDURE — 99283 EMERGENCY DEPT VISIT LOW MDM: CPT

## 2024-02-09 PROCEDURE — 85025 COMPLETE CBC W/AUTO DIFF WBC: CPT | Performed by: STUDENT IN AN ORGANIZED HEALTH CARE EDUCATION/TRAINING PROGRAM

## 2024-02-09 PROCEDURE — 80053 COMPREHEN METABOLIC PANEL: CPT | Performed by: STUDENT IN AN ORGANIZED HEALTH CARE EDUCATION/TRAINING PROGRAM

## 2024-02-09 PROCEDURE — 99283 EMERGENCY DEPT VISIT LOW MDM: CPT | Performed by: STUDENT IN AN ORGANIZED HEALTH CARE EDUCATION/TRAINING PROGRAM

## 2024-02-09 RX ORDER — ONDANSETRON 4 MG/1
4 TABLET, ORALLY DISINTEGRATING ORAL EVERY 8 HOURS PRN
Qty: 9 TABLET | Refills: 0 | Status: SHIPPED | OUTPATIENT
Start: 2024-02-09 | End: 2024-02-12

## 2024-02-09 RX ORDER — ONDANSETRON 2 MG/ML
4 INJECTION INTRAMUSCULAR; INTRAVENOUS ONCE
Status: DISCONTINUED | OUTPATIENT
Start: 2024-02-09 | End: 2024-02-09 | Stop reason: HOSPADM

## 2024-02-10 NOTE — FSED PROVIDER NOTE
Subjective   History of Present Illness  Pt is a 33 y.o. female with PMH as listed who presents for   Chief Complaint   Patient presents with    Abdominal Pain     Onset  Thursday morning 2/08/24 with nausea       Patient is a 33-year-old female presents for abdominal pain, back pain, nausea for the past 2 days.  States that she ate sushi on Wednesday and then by Thursday morning she began to feel nausea and some lower abdominal pain and cramping.  Describes the pain in her back as well.  Denies any vaginal bleeding, discharge, dysuria, fever, vomiting.  Has no chest pain or shortness of breath.  Denies any other associated symptoms.  States that the symptoms been persistent since Thursday morning, no specific exacerbating leaving factors.  Has no other new complaints at this time.    Review of Systems    Past Medical History:   Diagnosis Date    Acid reflux     Anxiety     Asthma     Chest pain     left side    HL (hearing loss)     Migraines        No Known Allergies    Past Surgical History:   Procedure Laterality Date    WISDOM TOOTH EXTRACTION Bilateral        Family History   Adopted: Yes       Social History     Socioeconomic History    Marital status:     Number of children: 0   Tobacco Use    Smoking status: Never    Smokeless tobacco: Never    Tobacco comments:     Caffeine use minimal   Vaping Use    Vaping Use: Never used   Substance and Sexual Activity    Alcohol use: Never    Drug use: Defer    Sexual activity: Defer           Objective   Physical Exam  Constitutional:       Appearance: Normal appearance.   HENT:      Head: Normocephalic and atraumatic.      Mouth/Throat:      Mouth: Mucous membranes are moist.      Pharynx: Oropharynx is clear.   Eyes:      Conjunctiva/sclera: Conjunctivae normal.   Cardiovascular:      Rate and Rhythm: Normal rate and regular rhythm.   Pulmonary:      Effort: Pulmonary effort is normal.      Breath sounds: Normal breath sounds.   Abdominal:      General:  Abdomen is flat.      Palpations: Abdomen is soft.      Tenderness: There is no abdominal tenderness.   Musculoskeletal:      Cervical back: Neck supple.   Skin:     General: Skin is warm and dry.   Neurological:      Mental Status: She is alert.   Psychiatric:         Mood and Affect: Mood normal.         Procedures           ED Course  ED Course as of 02/09/24 2058 Fri Feb 09, 2024 1938 Patient is a 33-year-old female presents for abdominal pain, nausea, back pain for the past 2 days.  Exam is unremarkable.  Will obtain CBC, CMP, lipase, UA, hCG.  Patient given Zofran for nausea. [JF]   2000 Patient declined Zofran. [JF]   2056 Lab work all unremarkable for any acute findings other than hemoglobin of 11.5.  Discussed with patient results of workup plan for discharge with PCP follow-up for recheck of hemoglobin.  Patient understands and agrees with plan of care.  Patient given prescription for Zofran at time of discharge.  All questions answered. [JF]      ED Course User Index  [JF] Guille Blanco MD                                           Medical Decision Making  My differential diagnosis for abdominal pain includes but is not limited to:  Gastritis, gastroenteritis, peptic ulcer disease, GERD, esophageal perforation, acute appendicitis, mesenteric adenitis, Meckel's diverticulum, epiploic appendagitis, diverticulitis, colon cancer, ulcerative colitis, Crohn's disease, intussusception, small bowel obstruction, adhesions, ischemic bowel, perforated viscus, ileus, obstipation, biliary colic, cholecystitis, cholelithiasis, Nirav-Ricardo Anil, hepatitis, pancreatitis, common bile duct obstruction, cholangitis, bile leak, splenic trauma, splenic rupture, splenic infarction, splenic abscess, abdominal abscess, ascites, spontaneous bacterial peritonitis, hernia, UTI, cystitis,ureterolithiasis, urinary obstruction, ovarian cyst, torsion, pregnancy, ectopic pregnancy, PID, pelvic abscess, mittelschmerz,  endometriosis, AAA, myocardial infarction, pneumonia, cancer, porphyria, DKA, medications, sickle cell, viral syndrome, zoster      Problems Addressed:  Lower abdominal pain: complicated acute illness or injury  Nausea: complicated acute illness or injury    Amount and/or Complexity of Data Reviewed  Labs: ordered.     Details: All lab work unremarkable, UA negative.    Risk  Prescription drug management.        Final diagnoses:   Lower abdominal pain   Nausea       ED Disposition  ED Disposition       ED Disposition   Discharge    Condition   Stable    Comment   --               Vania Sherwood MD  [address] [id number] [phone]    Schedule an appointment as soon as possible for a visit in 3 days  For re-evaluation         Medication List        New Prescriptions      ondansetron ODT 4 MG disintegrating tablet  Commonly known as: ZOFRAN-ODT  Place 1 tablet on the tongue Every 8 (Eight) Hours As Needed for Nausea for up to 3 days.               Where to Get Your Medications        These medications were sent to [facility] [address] [place] [address] [facility] - [phone]  - [phone]   [id number] [place] [address]      Phone: [phone]   ondansetron ODT 4 MG disintegrating tablet

## 2024-02-13 ENCOUNTER — TELEPHONE (OUTPATIENT)
Dept: GASTROENTEROLOGY | Facility: CLINIC | Age: 34
End: 2024-02-13

## 2024-02-13 NOTE — TELEPHONE ENCOUNTER
Caller: Digna Oneill    Relationship: Self    Best call back number: [phone]     What was the call regarding: PT WILL BE CALLING TOMORROW 02/14/24 TO GET ARRIVAL TIME FOR PROCEDURE ON FRIDAY 2/16/24.

## 2024-02-16 ENCOUNTER — LAB REQUISITION (OUTPATIENT)
Dept: LAB | Facility: HOSPITAL | Age: 34
End: 2024-02-16
Payer: COMMERCIAL

## 2024-02-16 ENCOUNTER — OUTSIDE FACILITY SERVICE (OUTPATIENT)
Dept: GASTROENTEROLOGY | Facility: CLINIC | Age: 34
End: 2024-02-16
Payer: COMMERCIAL

## 2024-02-16 DIAGNOSIS — K21.9 GASTROESOPHAGEAL REFLUX DISEASE, UNSPECIFIED WHETHER ESOPHAGITIS PRESENT: ICD-10-CM

## 2024-02-16 PROCEDURE — 43239 EGD BIOPSY SINGLE/MULTIPLE: CPT | Performed by: INTERNAL MEDICINE

## 2024-02-16 PROCEDURE — 88305 TISSUE EXAM BY PATHOLOGIST: CPT | Performed by: INTERNAL MEDICINE

## 2024-02-20 LAB
LAB AP CASE REPORT: NORMAL
PATH REPORT.FINAL DX SPEC: NORMAL
PATH REPORT.GROSS SPEC: NORMAL

## 2024-03-18 ENCOUNTER — TELEMEDICINE (OUTPATIENT)
Dept: FAMILY MEDICINE CLINIC | Facility: TELEHEALTH | Age: 34
End: 2024-03-18
Payer: COMMERCIAL

## 2024-03-18 DIAGNOSIS — N39.0 URINARY TRACT INFECTION WITHOUT HEMATURIA, SITE UNSPECIFIED: Primary | ICD-10-CM

## 2024-03-18 RX ORDER — ONDANSETRON 4 MG/1
4 TABLET, ORALLY DISINTEGRATING ORAL EVERY 8 HOURS PRN
Qty: 30 TABLET | Refills: 0 | Status: SHIPPED | OUTPATIENT
Start: 2024-03-18 | End: 2024-03-28

## 2024-03-18 RX ORDER — PHENAZOPYRIDINE HYDROCHLORIDE 200 MG/1
200 TABLET, FILM COATED ORAL 3 TIMES DAILY PRN
Qty: 6 TABLET | Refills: 0 | Status: SHIPPED | OUTPATIENT
Start: 2024-03-18 | End: 2024-03-20

## 2024-03-18 RX ORDER — SULFAMETHOXAZOLE AND TRIMETHOPRIM 800; 160 MG/1; MG/1
1 TABLET ORAL 2 TIMES DAILY
Qty: 20 TABLET | Refills: 0 | Status: SHIPPED | OUTPATIENT
Start: 2024-03-18 | End: 2024-03-28

## 2024-03-19 NOTE — PROGRESS NOTES
You have chosen to receive care through a telehealth visit.  Do you consent to use a video/audio connection for your medical care today? Yes     CHIEF COMPLAINT  Chief Complaint   Patient presents with    Urinary Tract Infection         HPI  Digna Valerio is a 33 y.o. female  presents with complaint of burning on urination and urinary frequency that started today.  She states that yesterday she noticed white particles in her urine, but not today. She also states that she has had chills and nausea.    Review of Systems   Genitourinary:  Positive for dysuria and frequency.   All other systems reviewed and are negative.      Past Medical History:   Diagnosis Date    Acid reflux     Anxiety     Asthma     Chest pain     left side    HL (hearing loss)     Migraines        Family History   Adopted: Yes       Social History     Socioeconomic History    Marital status:     Number of children: 0   Tobacco Use    Smoking status: Never    Smokeless tobacco: Never    Tobacco comments:     Caffeine use minimal   Vaping Use    Vaping status: Never Used   Substance and Sexual Activity    Alcohol use: Never    Drug use: Defer    Sexual activity: Defer       Digna Valerio  reports that she has never smoked. She has never used smokeless tobacco.          There were no vitals taken for this visit.    PHYSICAL EXAM  Physical Exam   Constitutional: She appears well-developed and well-nourished.   HENT:   Head: Normocephalic.   Eyes: Pupils are equal, round, and reactive to light.   Pulmonary/Chest: Effort normal.   Abdominal:   Self guided abd exam did not reveal any tenderness on palpation   Musculoskeletal: Normal range of motion.   Neurological: She is alert.   Psychiatric: She has a normal mood and affect.       Results for orders placed or performed in visit on 02/16/24   Tissue Pathology Exam    Specimen: 1: Small Intestine, Duodenum; Tissue    2: Stomach; Tissue   Result Value Ref Range    Case Report        Surgical Pathology Report                         Case: [id number]                                  Authorizing Provider:  Anthony Lee MD    Collected:           02/16/2024 10:36 AM          Ordering Location:     [facility]  Received:            02/16/2024 05:22 PM                                 EPIC CARE LINK                                                               Pathologist:           Rosalind Jensen MD                                                    Specimens:   1) - Small Intestine, Duodenum, small bowel r/o celiac                                              2) - Stomach, antrum   gastritis                                                           Final Diagnosis       1.  Duodenum, biopsy:   -Benign duodenal mucosa without diagnostic abnormality.    2.  Stomach, antrum, biopsy:   -Benign oxyntic mucosa with focal changes suggesting early fundic polyp formation.   -No evidence of active gastritis nor Helicobacter identified on routine stain.   -No metaplasia nor dysplasia identified.    [facility]      Gross Description       1. Small Intestine, Duodenum.  The specimen is received in formalin labeled with the patient's name and further designated 'small bowel r/o celiac biopsy' is a small fragment of gray-tan tissue. The specimen is submitted for embedding as received.      2. Stomach.  The specimen is received in formalin labeled with the patient's name and further designated 'antrum gastritis biopsy' are multiple small fragments of gray-tan tissue. The specimen is submitted for embedding as received.             Diagnoses and all orders for this visit:    1. Urinary tract infection without hematuria, site unspecified (Primary)  -     sulfamethoxazole-trimethoprim (Bactrim DS) 800-160 MG per tablet; Take 1 tablet by mouth 2 (Two) Times a Day for 10 days.  Dispense: 20 tablet; Refill: 0  -     phenazopyridine (Pyridium) 200 MG tablet; Take 1 tablet by mouth 3 (Three) Times a  Day As Needed for Dysuria or Bladder Spasms for up to 2 days.  Dispense: 6 tablet; Refill: 0  -     ondansetron ODT (ZOFRAN-ODT) 4 MG disintegrating tablet; Place 1 tablet on the tongue Every 8 (Eight) Hours As Needed for Nausea or Vomiting for up to 10 days.  Dispense: 30 tablet; Refill: 0          FOLLOW-UP  As discussed during visit with PCP/[facility] if no improvement or Urgent Care/Emergency Department if worsening of symptoms    Patient verbalizes understanding of medication dosage, comfort measures, instructions for treatment and follow-up.    Evon Zabala, APRN  03/18/2024  22:11 EDT    The use of a video visit has been reviewed with the patient and verbal informed consent has been obtained. Myself and Digna Valerio participated in this visit. The patient is located in [address] [place] [address].    I am located in [place], [place]. Mychart and Twilio were utilized. I spent 10 minutes in the patient's chart for this visit.      Note Disclaimer: At [facility], we believe that sharing information builds trust and better   relationships. You are receiving this note because you recently visited [facility]. It is possible you   will see health information before a provider has talked with you about it. This kind of information can   be easy to misunderstand. To help you fully understand what it means for your health, we urge you to   discuss this note with your provider.

## 2024-03-20 ENCOUNTER — TELEPHONE (OUTPATIENT)
Dept: FAMILY MEDICINE CLINIC | Facility: CLINIC | Age: 34
End: 2024-03-20

## 2024-03-20 NOTE — TELEPHONE ENCOUNTER
Caller: Digna Oneill    Relationship: Self    Best call back number: [phone]    What is the best time to reach you: ANYTIME    Who are you requesting to speak with (clinical staff, provider,  specific staff member): CLINICAL    What was the call regarding:PATIENT STARTED TAKING HER ANTIBIOTIC sulfamethoxazole-trimethoprim (Bactrim DS) 800-160 MG per tablet FOR A UTI, PATIENT WOKE UP TODAY WITH A SIDE EFFECT OF LOCKED JAW, AND STOMACH PROBLEMS. PATIENT WAS WONDERING IF SOMETHING ELSE CAN BE CALLED IN.     [address] [address] [facility] [phone] [facility] [phone]

## 2024-03-21 DIAGNOSIS — N39.0 URINARY TRACT INFECTION WITHOUT HEMATURIA, SITE UNSPECIFIED: Primary | ICD-10-CM

## 2024-03-21 RX ORDER — NITROFURANTOIN 25; 75 MG/1; MG/1
100 CAPSULE ORAL 2 TIMES DAILY
Qty: 14 CAPSULE | Refills: 0 | Status: SHIPPED | OUTPATIENT
Start: 2024-03-21

## 2024-05-07 ENCOUNTER — OFFICE VISIT (OUTPATIENT)
Dept: FAMILY MEDICINE CLINIC | Facility: CLINIC | Age: 34
End: 2024-05-07
Payer: COMMERCIAL

## 2024-05-07 VITALS
DIASTOLIC BLOOD PRESSURE: 70 MMHG | TEMPERATURE: 98.4 F | WEIGHT: 136 LBS | SYSTOLIC BLOOD PRESSURE: 100 MMHG | BODY MASS INDEX: 24.1 KG/M2 | HEART RATE: 69 BPM | HEIGHT: 63 IN | RESPIRATION RATE: 16 BRPM | OXYGEN SATURATION: 100 %

## 2024-05-07 DIAGNOSIS — R53.83 OTHER FATIGUE: Primary | ICD-10-CM

## 2024-05-07 LAB
FOLATE SERPL-MCNC: 15.1 NG/ML (ref 4.78–24.2)
TSH SERPL DL<=0.005 MIU/L-ACNC: 0.65 UIU/ML (ref 0.27–4.2)
VIT B12 SERPL-MCNC: 1407 PG/ML (ref 211–946)

## 2024-05-07 PROCEDURE — 99213 OFFICE O/P EST LOW 20 MIN: CPT | Performed by: STUDENT IN AN ORGANIZED HEALTH CARE EDUCATION/TRAINING PROGRAM

## 2024-05-07 NOTE — PROGRESS NOTES
"Chief Complaint  Fatigue (Cough x 1 week. Vision blurred x last month (last eye doctor 2023) Chills off/on way before the coughing./Nausea but says she started her period today so it may be because of that./Night sweats  past week./Previous UTI and Ear infection Left.)    Subjective        Digna Valerio presents to [facility]  History of Present Illness  With chief complaints of fatigue along with persistent cough. Pt stated she has been noticing she is getting URTI symptoms lot these days and was concerned having her immunity low for some reason leading to all these viral infections.  Pt stated she had Er visit on 04/09 for ear infection and she was prescribed ear drops antibiotics and after that she felt better but again she developed URTI symptoms.  Objective   Vital Signs:  /70 (BP Location: Left arm, Patient Position: Sitting, Cuff Size: Adult)   Pulse 69   Temp 98.4 °F (36.9 °C) (Oral)   Resp 16   Ht 160 cm (63\")   Wt 61.7 kg (136 lb)   SpO2 100%   BMI 24.09 kg/m²   Estimated body mass index is 24.09 kg/m² as calculated from the following:    Height as of this encounter: 160 cm (63\").    Weight as of this encounter: 61.7 kg (136 lb).       BMI is within normal parameters. No other follow-up for BMI required.      Physical Exam  HENT:      Right Ear: Tympanic membrane, ear canal and external ear normal.      Left Ear: Tympanic membrane, ear canal and external ear normal.      Mouth/Throat:      Mouth: Mucous membranes are moist.      Pharynx: Oropharynx is clear. Posterior oropharyngeal erythema present. No oropharyngeal exudate.   Eyes:      Extraocular Movements: Extraocular movements intact.      Conjunctiva/sclera: Conjunctivae normal.      Pupils: Pupils are equal, round, and reactive to light.   Cardiovascular:      Pulses: Normal pulses.   Pulmonary:      Effort: Pulmonary effort is normal.      Breath sounds: Normal breath sounds.   Abdominal:      " General: Bowel sounds are normal.      Palpations: Abdomen is soft.   Skin:     General: Skin is warm.   Neurological:      Mental Status: She is alert and oriented to person, place, and time.        Result Review :    The following data was reviewed by: Vania Sherwood MD on 05/07/2024:  TSH          12/15/2023    10:40 5/7/2024    11:14   TSH   TSH 0.673  0.647                   Assessment and Plan     Diagnoses and all orders for this visit:    1. Other fatigue (Primary)  -     TSH  -     Vitamin B12  -     Folate  -     POCT SARS-CoV-2 Antigen CODY + Flu    # for fatigue pt is advised to make sure she eat healthy meals without skipping any meals, also should supplement with MV  Will check TSH , vit b12, folate level to rule out any secondary cause of fatigue, likely due to recurrent viral infection.  She is borderline anemic hb ~11       Follow Up     No follow-ups on file.  Patient was given instructions and counseling regarding her condition or for health maintenance advice. Please see specific information pulled into the AVS if appropriate.

## 2024-06-17 ENCOUNTER — TELEPHONE (OUTPATIENT)
Dept: NEUROLOGY | Facility: CLINIC | Age: 34
End: 2024-06-17
Payer: COMMERCIAL

## 2024-07-05 ENCOUNTER — OFFICE VISIT (OUTPATIENT)
Dept: FAMILY MEDICINE CLINIC | Facility: CLINIC | Age: 34
End: 2024-07-05
Payer: COMMERCIAL

## 2024-07-05 VITALS
OXYGEN SATURATION: 99 % | HEIGHT: 63 IN | SYSTOLIC BLOOD PRESSURE: 100 MMHG | DIASTOLIC BLOOD PRESSURE: 64 MMHG | RESPIRATION RATE: 16 BRPM | TEMPERATURE: 98.5 F | BODY MASS INDEX: 24.64 KG/M2 | HEART RATE: 83 BPM | WEIGHT: 139.1 LBS

## 2024-07-05 DIAGNOSIS — H66.001 ACUTE SUPPURATIVE OTITIS MEDIA OF RIGHT EAR WITHOUT SPONTANEOUS RUPTURE OF TYMPANIC MEMBRANE, RECURRENCE NOT SPECIFIED: Primary | ICD-10-CM

## 2024-07-05 DIAGNOSIS — H65.02 ACUTE SEROUS OTITIS MEDIA OF LEFT EAR, RECURRENCE NOT SPECIFIED: ICD-10-CM

## 2024-07-05 PROCEDURE — 99213 OFFICE O/P EST LOW 20 MIN: CPT | Performed by: NURSE PRACTITIONER

## 2024-07-05 RX ORDER — CEFDINIR 300 MG/1
300 CAPSULE ORAL 2 TIMES DAILY
Qty: 20 CAPSULE | Refills: 0 | Status: SHIPPED | OUTPATIENT
Start: 2024-07-05 | End: 2024-07-15

## 2024-07-05 RX ORDER — AZELASTINE 1 MG/ML
1 SPRAY, METERED NASAL 2 TIMES DAILY
Qty: 1 EACH | Refills: 1 | Status: SHIPPED | OUTPATIENT
Start: 2024-07-05 | End: 2024-07-19

## 2024-07-05 NOTE — PROGRESS NOTES
"Subjective     Digna Ibarra is a 33 y.o.. female.     Ear Injury  This is a new problem. Episode onset: 3 days. The problem has been gradually worsening. Pertinent negatives include no congestion, coughing, fever, headaches, nausea, sore throat or vomiting. Associated symptoms comments: Swelling and pain.       The following portions of the patient's history were reviewed and updated as appropriate: allergies, current medications, past family history, past medical history, past social history, past surgical history and problem list.    Past Medical History:   Diagnosis Date    Acid reflux     Anxiety     Asthma     Chest pain     left side    HL (hearing loss)     Migraines        Past Surgical History:   Procedure Laterality Date    WISDOM TOOTH EXTRACTION Bilateral        Review of Systems   Constitutional:  Negative for fever.   HENT:  Positive for ear pain (right). Negative for congestion, postnasal drip, rhinorrhea and sore throat.         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  Negative for cough.    Cardiovascular: Negative.    Gastrointestinal: Negative.  Negative for diarrhea, nausea and vomiting.   Neurological: Negative.  Negative for headaches.       No Known Allergies    Objective     Vitals:    07/05/24 1140   BP: 100/64   BP Location: Right arm   Patient Position: Sitting   Cuff Size: Adult   Pulse: 83   Resp: 16   Temp: 98.5 °F (36.9 °C)   TempSrc: Oral   SpO2: 99%   Weight: 63.1 kg (139 lb 1.6 oz)   Height: 160 cm (62.99\")     Body mass index is 24.65 kg/m².    Physical Exam  Vitals reviewed.   Constitutional:       Appearance: She is well-developed.   HENT:      Head: Normocephalic and atraumatic.      Right Ear: Tympanic membrane normal. A middle ear effusion (white hazy) is present. Tympanic membrane is not erythematous.      Left Ear: Tympanic membrane normal. A middle ear effusion (clear fluid) is present. Tympanic " membrane is not erythematous.      Nose: Nose normal.      Mouth/Throat:      Mouth: Mucous membranes are moist.      Pharynx: No pharyngeal swelling, oropharyngeal exudate or posterior oropharyngeal erythema.   Eyes:      Conjunctiva/sclera: Conjunctivae normal.      Pupils: Pupils are equal, round, and reactive to light.   Cardiovascular:      Rate and Rhythm: Normal rate and regular rhythm.      Heart sounds: No murmur heard.  Pulmonary:      Effort: Pulmonary effort is normal.      Breath sounds: No wheezing, rhonchi or rales.   Musculoskeletal:         General: Normal range of motion.   Lymphadenopathy:      Head:      Right side of head: Posterior auricular adenopathy present.      Cervical: Cervical adenopathy present.   Skin:     General: Skin is warm and dry.   Neurological:      Mental Status: She is alert and oriented to person, place, and time.           Current Outpatient Medications:     albuterol sulfate  (90 Base) MCG/ACT inhaler, INHALE 2 PUFFS BY MOUTH EVERY 4 HOURS AS NEEDED FOR WHEEZING FOR SHORTNESS OF BREATH, Disp: , Rfl:     CALCIUM-MAGNESIUM-VITAMIN D PO, Take  by mouth., Disp: , Rfl:     ipratropium-albuterol (DUO-NEB) 0.5-2.5 mg/3 ml nebulizer, USE 1 VIAL IN NEBULIZER EVERY 6 HOURS AS NEEDED FOR WHEEZING FOR SHORTNESS OF BREATH, Disp: , Rfl:     azelastine (ASTELIN) 0.1 % nasal spray, 1 spray into the nostril(s) as directed by provider 2 (Two) Times a Day for 14 days. Use in each nostril as directed, Disp: 1 each, Rfl: 1    cefdinir (OMNICEF) 300 MG capsule, Take 1 capsule by mouth 2 (Two) Times a Day for 10 days., Disp: 20 capsule, Rfl: 0      Diagnoses and all orders for this visit:    1. Acute suppurative otitis media of right ear without spontaneous rupture of tympanic membrane, recurrence not specified (Primary)  -     cefdinir (OMNICEF) 300 MG capsule; Take 1 capsule by mouth 2 (Two) Times a Day for 10 days.  Dispense: 20 capsule; Refill: 0    2. Acute serous otitis media of  left ear, recurrence not specified  -     azelastine (ASTELIN) 0.1 % nasal spray; 1 spray into the nostril(s) as directed by provider 2 (Two) Times a Day for 14 days. Use in each nostril as directed  Dispense: 1 each; Refill: 1        Patient Instructions   Drink plenty of fluids-water preferably, eat a heart healthy diet, and get plenty of sleep.    Return if symptoms worsen or fail to improve, for Dr. Sherwood as needed/as recommended.

## 2024-07-25 ENCOUNTER — OFFICE VISIT (OUTPATIENT)
Dept: FAMILY MEDICINE CLINIC | Facility: CLINIC | Age: 34
End: 2024-07-25
Payer: COMMERCIAL

## 2024-07-25 VITALS
HEART RATE: 83 BPM | WEIGHT: 138.8 LBS | SYSTOLIC BLOOD PRESSURE: 112 MMHG | HEIGHT: 63 IN | OXYGEN SATURATION: 98 % | DIASTOLIC BLOOD PRESSURE: 70 MMHG | TEMPERATURE: 98.5 F | BODY MASS INDEX: 24.59 KG/M2 | RESPIRATION RATE: 16 BRPM

## 2024-07-25 DIAGNOSIS — H65.02 ACUTE SEROUS OTITIS MEDIA OF LEFT EAR, RECURRENCE NOT SPECIFIED: Primary | ICD-10-CM

## 2024-07-25 DIAGNOSIS — K62.89 RECTAL DISCOMFORT: ICD-10-CM

## 2024-07-25 PROCEDURE — 99214 OFFICE O/P EST MOD 30 MIN: CPT | Performed by: STUDENT IN AN ORGANIZED HEALTH CARE EDUCATION/TRAINING PROGRAM

## 2024-07-25 RX ORDER — AMOXICILLIN AND CLAVULANATE POTASSIUM 875; 125 MG/1; MG/1
1 TABLET, FILM COATED ORAL 2 TIMES DAILY
Qty: 14 TABLET | Refills: 0 | Status: SHIPPED | OUTPATIENT
Start: 2024-07-25

## 2024-07-25 RX ORDER — HYDROCORTISONE 25 MG/G
CREAM TOPICAL 2 TIMES DAILY
Qty: 28 G | Refills: 0 | Status: SHIPPED | OUTPATIENT
Start: 2024-07-25

## 2024-08-03 NOTE — PROGRESS NOTES
"Chief Complaint  Earache (X THIS LAST WEEKEND SUNDAY./OTC TYLENOL, EAR DROPS ANTIBIOTIC PRESCRIBED EARLIER THIS YEAR.), Fever (X THIS LAST WEEKEND SUNDAY./REPORTS TEMP IN THE 99.0), Chills (X THIS LAST WEEKEND SUNDAY.), and Rectal Pain (WHEN STOPS TAKING THE PREP H THE ANAL PAIN RETURNS X A WHILE. PT REPORTS THE PAIN IS COMING MORE INTENSE.)    Subjective        Digna Ibarra presents to [facility]  Earache     Fever   Associated symptoms include ear pain.   Chills  Associated symptoms include chills and a fever.   Rectal Pain  Associated symptoms include chills and a fever.     For fever, earache, chills for almost a week.  Same month patient had another episode of ear infection and was treated with antibiotics.  Patient is getting earache symptoms multiple times in the last couple of months  Also complaining of rectal pain and is using Cream.  Patient stated when he uses [place] explained that his symptoms resolved but once he stopped then symptoms return  Objective   Vital Signs:  /70 (BP Location: Left arm, Patient Position: Sitting, Cuff Size: Adult)   Pulse 83   Temp 98.5 °F (36.9 °C) (Oral)   Resp 16   Ht 160 cm (63\")   Wt 63 kg (138 lb 12.8 oz)   SpO2 98%   BMI 24.59 kg/m²   Estimated body mass index is 24.59 kg/m² as calculated from the following:    Height as of this encounter: 160 cm (63\").    Weight as of this encounter: 63 kg (138 lb 12.8 oz).       BMI is within normal parameters. No other follow-up for BMI required.      Physical Exam  HENT:      Right Ear: External ear normal.      Left Ear: External ear normal.   Cardiovascular:      Rate and Rhythm: Normal rate.      Pulses: Normal pulses.      Heart sounds: Normal heart sounds.   Pulmonary:      Effort: Pulmonary effort is normal.      Breath sounds: Normal breath sounds.   Neurological:      Mental Status: She is alert and oriented to person, place, and time.        Result Review :                "      Assessment and Plan     Diagnoses and all orders for this visit:    1. Acute serous otitis media of left ear, recurrence not specified (Primary)  -     amoxicillin-clavulanate (AUGMENTIN) 875-125 MG per tablet; Take 1 tablet by mouth 2 (Two) Times a Day.  Dispense: 14 tablet; Refill: 0  -     Ambulatory Referral to ENT (Otolaryngology)    2. Rectal discomfort  -     Hydrocortisone, Perianal, (ANUSOL-HC) 2.5 % rectal cream; Insert  into the rectum 2 (Two) Times a Day. DO NOT USE MORE THAN 2 WEEKS CONTINUOUSLY  Dispense: 28 g; Refill: 0    For ear infection  Advised patient to take Augmentin  Since patient is getting recurrent ear symptoms also advised if infection is not getting cleared with above antibiotics patient needs to follow-up with ENT for further management    For rectal discomfort  Advised patient to apply hydrocortisone cream at the affected area twice daily.  Warned patient not to use continuously for more than 2 to avoid side effects  RTC in your next scheduled visit         Follow Up     No follow-ups on file.  Patient was given instructions and counseling regarding her condition or for health maintenance advice. Please see specific information pulled into the AVS if appropriate.

## 2024-08-25 ENCOUNTER — APPOINTMENT (OUTPATIENT)
Dept: CT IMAGING | Facility: HOSPITAL | Age: 34
End: 2024-08-25
Payer: COMMERCIAL

## 2024-08-25 ENCOUNTER — HOSPITAL ENCOUNTER (EMERGENCY)
Facility: HOSPITAL | Age: 34
Discharge: HOME | End: 2024-08-25
Attending: EMERGENCY MEDICINE | Admitting: EMERGENCY MEDICINE
Payer: COMMERCIAL

## 2024-08-25 VITALS
RESPIRATION RATE: 18 BRPM | SYSTOLIC BLOOD PRESSURE: 101 MMHG | HEIGHT: 63 IN | BODY MASS INDEX: 25.14 KG/M2 | WEIGHT: 141.9 LBS | OXYGEN SATURATION: 100 % | TEMPERATURE: 98.1 F | DIASTOLIC BLOOD PRESSURE: 70 MMHG | HEART RATE: 57 BPM

## 2024-08-25 DIAGNOSIS — G43.409 HEMIPLEGIC MIGRAINE WITHOUT STATUS MIGRAINOSUS, NOT INTRACTABLE: Primary | ICD-10-CM

## 2024-08-25 LAB
ALBUMIN SERPL-MCNC: 4.1 G/DL (ref 3.5–5.2)
ALBUMIN/GLOB SERPL: 1.5 G/DL
ALP SERPL-CCNC: 67 U/L (ref 39–117)
ALT SERPL W P-5'-P-CCNC: 11 U/L (ref 1–33)
ANION GAP SERPL CALCULATED.3IONS-SCNC: 7.5 MMOL/L (ref 5–15)
AST SERPL-CCNC: 14 U/L (ref 1–32)
BASOPHILS # BLD AUTO: 0.02 10*3/MM3 (ref 0–0.2)
BASOPHILS NFR BLD AUTO: 0.3 % (ref 0–1.5)
BILIRUB SERPL-MCNC: 0.3 MG/DL (ref 0–1.2)
BUN SERPL-MCNC: 8 MG/DL (ref 6–20)
BUN/CREAT SERPL: 9.9 (ref 7–25)
CALCIUM SPEC-SCNC: 9.1 MG/DL (ref 8.6–10.5)
CHLORIDE SERPL-SCNC: 103 MMOL/L (ref 98–107)
CO2 SERPL-SCNC: 24.5 MMOL/L (ref 22–29)
CREAT SERPL-MCNC: 0.81 MG/DL (ref 0.57–1)
DEPRECATED RDW RBC AUTO: 43.1 FL (ref 37–54)
EGFRCR SERPLBLD CKD-EPI 2021: 99.7 ML/MIN/1.73
EOSINOPHIL # BLD AUTO: 0.12 10*3/MM3 (ref 0–0.4)
EOSINOPHIL NFR BLD AUTO: 1.9 % (ref 0.3–6.2)
ERYTHROCYTE [DISTWIDTH] IN BLOOD BY AUTOMATED COUNT: 12.4 % (ref 12.3–15.4)
GLOBULIN UR ELPH-MCNC: 2.7 GM/DL
GLUCOSE BLDC GLUCOMTR-MCNC: 83 MG/DL (ref 70–130)
GLUCOSE SERPL-MCNC: 97 MG/DL (ref 65–99)
HCG SERPL QL: NEGATIVE
HCT VFR BLD AUTO: 36.4 % (ref 34–46.6)
HGB BLD-MCNC: 12 G/DL (ref 12–15.9)
IMM GRANULOCYTES # BLD AUTO: 0 10*3/MM3 (ref 0–0.05)
IMM GRANULOCYTES NFR BLD AUTO: 0 % (ref 0–0.5)
LYMPHOCYTES # BLD AUTO: 2.18 10*3/MM3 (ref 0.7–3.1)
LYMPHOCYTES NFR BLD AUTO: 34.2 % (ref 19.6–45.3)
MAGNESIUM SERPL-MCNC: 2 MG/DL (ref 1.6–2.6)
MCH RBC QN AUTO: 30.7 PG (ref 26.6–33)
MCHC RBC AUTO-ENTMCNC: 33 G/DL (ref 31.5–35.7)
MCV RBC AUTO: 93.1 FL (ref 79–97)
MONOCYTES # BLD AUTO: 0.52 10*3/MM3 (ref 0.1–0.9)
MONOCYTES NFR BLD AUTO: 8.2 % (ref 5–12)
NEUTROPHILS NFR BLD AUTO: 3.54 10*3/MM3 (ref 1.7–7)
NEUTROPHILS NFR BLD AUTO: 55.4 % (ref 42.7–76)
PLATELET # BLD AUTO: 208 10*3/MM3 (ref 140–450)
PMV BLD AUTO: 10.1 FL (ref 6–12)
POTASSIUM SERPL-SCNC: 4 MMOL/L (ref 3.5–5.2)
PROT SERPL-MCNC: 6.8 G/DL (ref 6–8.5)
QT INTERVAL: 396 MS
QTC INTERVAL: 399 MS
RBC # BLD AUTO: 3.91 10*6/MM3 (ref 3.77–5.28)
SODIUM SERPL-SCNC: 135 MMOL/L (ref 136–145)
TROPONIN T SERPL HS-MCNC: <6 NG/L
WBC NRBC COR # BLD AUTO: 6.38 10*3/MM3 (ref 3.4–10.8)

## 2024-08-25 PROCEDURE — 96375 TX/PRO/DX INJ NEW DRUG ADDON: CPT

## 2024-08-25 PROCEDURE — 70450 CT HEAD/BRAIN W/O DYE: CPT

## 2024-08-25 PROCEDURE — 99284 EMERGENCY DEPT VISIT MOD MDM: CPT | Performed by: EMERGENCY MEDICINE

## 2024-08-25 PROCEDURE — 84703 CHORIONIC GONADOTROPIN ASSAY: CPT | Performed by: EMERGENCY MEDICINE

## 2024-08-25 PROCEDURE — 84484 ASSAY OF TROPONIN QUANT: CPT | Performed by: EMERGENCY MEDICINE

## 2024-08-25 PROCEDURE — 25010000002 DROPERIDOL PER 5 MG: Performed by: EMERGENCY MEDICINE

## 2024-08-25 PROCEDURE — 83735 ASSAY OF MAGNESIUM: CPT | Performed by: EMERGENCY MEDICINE

## 2024-08-25 PROCEDURE — 93005 ELECTROCARDIOGRAM TRACING: CPT | Performed by: EMERGENCY MEDICINE

## 2024-08-25 PROCEDURE — 25010000002 KETOROLAC TROMETHAMINE PER 15 MG: Performed by: EMERGENCY MEDICINE

## 2024-08-25 PROCEDURE — 25810000003 SODIUM CHLORIDE 0.9 % SOLUTION: Performed by: EMERGENCY MEDICINE

## 2024-08-25 PROCEDURE — 36415 COLL VENOUS BLD VENIPUNCTURE: CPT

## 2024-08-25 PROCEDURE — 25010000002 DEXAMETHASONE SODIUM PHOSPHATE 10 MG/ML SOLUTION: Performed by: EMERGENCY MEDICINE

## 2024-08-25 PROCEDURE — 80053 COMPREHEN METABOLIC PANEL: CPT | Performed by: EMERGENCY MEDICINE

## 2024-08-25 PROCEDURE — 96374 THER/PROPH/DIAG INJ IV PUSH: CPT

## 2024-08-25 PROCEDURE — 25010000002 DIPHENHYDRAMINE PER 50 MG: Performed by: EMERGENCY MEDICINE

## 2024-08-25 PROCEDURE — 85025 COMPLETE CBC W/AUTO DIFF WBC: CPT | Performed by: EMERGENCY MEDICINE

## 2024-08-25 PROCEDURE — 99284 EMERGENCY DEPT VISIT MOD MDM: CPT

## 2024-08-25 PROCEDURE — 93010 ELECTROCARDIOGRAM REPORT: CPT | Performed by: INTERNAL MEDICINE

## 2024-08-25 PROCEDURE — 82948 REAGENT STRIP/BLOOD GLUCOSE: CPT

## 2024-08-25 RX ORDER — DEXAMETHASONE SODIUM PHOSPHATE 10 MG/ML
10 INJECTION, SOLUTION INTRAMUSCULAR; INTRAVENOUS ONCE
Status: COMPLETED | OUTPATIENT
Start: 2024-08-25 | End: 2024-08-25

## 2024-08-25 RX ORDER — DIPHENHYDRAMINE HYDROCHLORIDE 50 MG/ML
25 INJECTION INTRAMUSCULAR; INTRAVENOUS ONCE
Status: COMPLETED | OUTPATIENT
Start: 2024-08-25 | End: 2024-08-25

## 2024-08-25 RX ORDER — BUTALBITAL, ACETAMINOPHEN AND CAFFEINE 50; 325; 40 MG/1; MG/1; MG/1
TABLET ORAL
Qty: 15 TABLET | Refills: 0 | Status: SHIPPED | OUTPATIENT
Start: 2024-08-25

## 2024-08-25 RX ORDER — DROPERIDOL 2.5 MG/ML
1.25 INJECTION, SOLUTION INTRAMUSCULAR; INTRAVENOUS ONCE
Status: COMPLETED | OUTPATIENT
Start: 2024-08-25 | End: 2024-08-25

## 2024-08-25 RX ORDER — KETOROLAC TROMETHAMINE 30 MG/ML
30 INJECTION, SOLUTION INTRAMUSCULAR; INTRAVENOUS ONCE
Status: COMPLETED | OUTPATIENT
Start: 2024-08-25 | End: 2024-08-25

## 2024-08-25 RX ORDER — PROCHLORPERAZINE MALEATE 10 MG
10 TABLET ORAL EVERY 8 HOURS PRN
Qty: 20 TABLET | Refills: 0 | Status: SHIPPED | OUTPATIENT
Start: 2024-08-25

## 2024-08-25 RX ADMIN — DIPHENHYDRAMINE HYDROCHLORIDE 25 MG: 50 INJECTION, SOLUTION INTRAMUSCULAR; INTRAVENOUS at 04:18

## 2024-08-25 RX ADMIN — DROPERIDOL 1.25 MG: 2.5 INJECTION, SOLUTION INTRAMUSCULAR; INTRAVENOUS at 04:15

## 2024-08-25 RX ADMIN — SODIUM CHLORIDE 500 ML: 9 INJECTION, SOLUTION INTRAVENOUS at 04:14

## 2024-08-25 RX ADMIN — DEXAMETHASONE SODIUM PHOSPHATE 10 MG: 10 INJECTION, SOLUTION INTRAMUSCULAR; INTRAVENOUS at 04:17

## 2024-08-25 RX ADMIN — KETOROLAC TROMETHAMINE 30 MG: 30 INJECTION, SOLUTION INTRAMUSCULAR at 04:21

## 2024-08-25 NOTE — Clinical Note
[id number] [facility]  Oncology Clinic  Follow-up Visit Note    CC:  Metastatic breast cancer    HISTORY OF PRESENT ILLNESS:  Johan Vaughn is a 82year old female with a diagnosis of metastatic breast cancer. The oncologic history is as follows: Oncologic history:  --11/25/08 biopsy-proven right breast cancer, lower inner quadrant - clinical stage IIA (T2 N0 M0), lobular carcinoma, 29 mm maximum dimension, grade 1, ER/MI positive, HER2 negative, multi gene recurrence risk score 23 (intermediate)  --12/11/08 right breast lumpectomy, sentinel lymph node biopsy - 2.9 cm, grade 1 invasive lobular carcinoma, (-) LVI, negative margins. Tumor ER positive, MI positive, HER2 negative. She had 3 lymph nodes removed, for T2 N0 disease. Oncotype 23  --2/2/09-2/27/09 complete adjuvant radiation therapy, 5256 cGy XRT, right breast  --3/2/09-11/18/09 initiate anastrozole 1 mg daily, March 2009-November 2009, treatment discontinued secondary to side effects  --11/30/09-7/9/10 treated with exemestane, discontinued secondary to side effects  --9/25/15 right breast, 12 o'clock position, grade 1 invasive lobular carcinoma, ER positive/MI positive, HER2 negative. Left axillary lymph node demonstrating metastatic lobular carcinoma. PET/CT scan - uptake within large right breast mass, left axilla.   Additionally with right supraclavicular mass demonstrating metastatic poorly differentiated adenocarcinoma, consistent with metastatic lobular carcinoma breast origin  --10/2/15 start tamoxifen 20 mg daily  --11/25/15 transition tamoxifen to Femara  --11/25/15 initiate palbociclib 125 mg daily, with subsequent dose reductions, ultimately 75 mg daily per 21 of each 28 day cycle  --4/4/22 PET/CT scan - hypermetabolic right breast mass with hypermetabolic right breast skin thickening, consistent with local recurrence  --4/12/22 presentation in context of disease progression, diagnostic mammography bilateral - bilateral [facility]  [address]  [place] [address]    Digna Ibarra was seen and treated in our emergency department on 8/25/2024.  She may return to work on 08/27/2024.         Thank you for choosing [facility].    Mic Calvillo MD       skin thickening and bilateral solid masses, recommended for biopsy  --4/22/22 bilateral breast biopsies, 12:00 positions, 5 cm from nipple  (a) left breast - invasive lobular carcinoma, moderately differentiated, the office (35%). HER2 negative by IHC (0)   (B) right breast - invasive lobular carcinoma, moderately differentiated, ER positive (70%), PA negative. HER2 negative by IHC (0)    Patient is a pleasant 82year old woman with previous history of hormone receptor positive, HER2 negative grade 1 lobular carcinoma of the right breast, diagnosed initially 2008. She initially underwent right breast lumpectomy, sentinel lymph node biopsy as of 12/11/08, pathology from which confirmed at the time stage IIA (T2 N0 M0) lobular carcinoma of the right breast, grade 1, ER/pr positive, HER2 negative. She underwent Oncotype DX testing, with intermediate risk of relapse (23), declined adjuvant chemotherapy. She subsequently proceeded with adjuvant radiation treatment, completed as of February 2009, thereafter proceed with anti endocrine therapy with anastrozole as of March 2009. She did not tolerate anti endocrine therapy, and ultimately discontinued treatment as of July 2010. She subsequently re-presented with right breast relapse as of September 2015. Biopsy again confirmed ER/PA positive, grade 1 invasive lobular carcinoma, with subsequent PET/CT scan demonstrating uptake within large right breast mass, left axilla, additionally with biopsy of right supraclavicular mass lesion demonstrating metastatic poorly differentiated adenocarcinoma, consistent with metastatic lobular carcinoma. She initiated tamoxifen 20 mg daily, and was thereafter transition to Femara concurrent with palbociclib 125 mg daily. Interval history:  Patient presents today in follow-up.   In the interim since I last met with her, she has completed PET/CT imaging, additionally completed diagnostic mammography and breast biopsy, results which demonstrated evidence of bilateral invasive lobular carcinoma of the breast, ER/NM positive, HER2 negative. She has noted some interval enlargement as well of her supraclavicular neck mass. She has questions regarding anticipated tolerance of treatment, acknowledging additionally that she has started Xeloda as of 1 week prior to today's visit, denies any ongoing significant diarrhea, abdominal cramping, nausea, change in appetite since doing so. She does monitor closely for evidence of hand/foot rash, although as of yet has not noted any. She has questions regarding recommendations for further follow-up.     Patient Active Problem List    Diagnosis Date Noted   â¢ S/P lumbar fusion 08/02/2021     Priority: Low   â¢ Urinary retention 06/20/2021     Priority: Low   â¢ Multinodular goiter 06/14/2021     Priority: Low   â¢ Spondylolisthesis 06/08/2021     Priority: Low   â¢ Left lumbar radiculopathy 06/08/2021     Priority: Low   â¢ Weakness 06/08/2021     Priority: Low   â¢ Osteoporosis 08/20/2020     Priority: Low   â¢ Hemiparesis as late effect of cerebrovascular accident (CVA) (CMS/[facility]) LEFT mild 3/2015 08/20/2020     Priority: Low   â¢ Vitamin D deficiency 08/20/2020     Priority: Low   â¢ Enthesopathy of hip region 05/27/2020     Priority: Low   â¢ Malignant neoplasm of overlapping sites of right breast in female, estrogen receptor positive (CMS/[facility]) 02/11/2020     Priority: Low   â¢ Constipation 08/08/2019     Priority: Low   â¢ Mixed irritable bowel syndrome 02/27/2019     Priority: Low   â¢ Personal history of colonic polyps 02/27/2019     Priority: Low   â¢ Presence of left artificial knee joint 04/03/2018     Priority: Low   â¢ Migraine headache 03/01/2018     Priority: Low   â¢ Thyroid nodule 03/01/2018     Priority: Low   â¢ Osteopenia 12/08/2017     Priority: Low   â¢ 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 (CMS/[facility]) 10/25/2017     Priority: Low   â¢ Gastro-esophageal reflux disease without esophagitis 10/18/2017     Priority: Low   â¢ Mixed hyperlipidemia 10/18/2017     Priority: Low   â¢ Neuropathy, peripheral 10/18/2017     Priority: Low   â¢ Non-toxic multinodular goiter 10/18/2017     Priority: Low   â¢ Rhinitis, allergic 10/18/2017     Priority: Low   â¢ Abnormal electrocardiogram 10/18/2017     Priority: Low   â¢ Malignant neoplasm of upper-outer quadrant of female breast (CMS/HCC) 10/06/2015     Priority: Low     Ca Breast Female NOS     â¢ Personal history of transient ischemic attack (TIA), and cerebral infarction without residual deficits 03/26/2015     Priority: Low     Stroke (CVA) NOS     â¢ Essential hypertension 03/03/2015     Priority: Low     Hypertension (HTN) Essential NOS         Past Medical History:   Diagnosis Date   â¢ Allergy    â¢ Breast cancer (CMS/[facility])    â¢ Cerebral infarction (CMS/[facility]) 2014    mild - slight left sided weakness   â¢ Chicken pox    â¢ Chronic constipation    â¢ Chronic diarrhea    â¢ Chronic pain    â¢ Essential hypertension 3/3/2015    Hypertension (HTN) Essential NOS   â¢ GERD (gastroesophageal reflux disease)    â¢ Hypertension    â¢ Measles    â¢ Mixed hyperlipidemia 10/18/2017   â¢ Mixed irritable bowel syndrome 2/27/2019   â¢ Osteopenia 12/8/2017   â¢ Osteoporosis    â¢ Personal history of colonic polyps 2/27/2019   â¢ Stroke (CMS/[facility]) 2014   â¢ Thyroid condition     thyroid nodules     Past Surgical History:   Procedure Laterality Date   â¢ Breast lumpectomy Right 01/01/2008   â¢ Breast lumpectomy Right 2009   â¢ Colonoscopy     â¢ Hip surgery Right 2010    Right hip replacement    â¢ Hysterectomy  2005   â¢ Ir spine injection  04/06/2021   â¢ Joint replacement Right 2010    right total hip replacement   â¢ Joint replacement Left 2018    left total knee replacement   â¢ Lumbar fusion Bilateral 06/15/2021    L3-4-5 Lami, L4-5 fusion with medtronic instrumentation by Dr. Gamaliel Mercado   â¢ Spine surgery  06/15/2021    L3 4 5 LAMINECTOMY AND L4-5 PEDICLE SCREW FIXATION AND FUSION WITH BONE GRAFT AND INSTRUMENTATION WITH BMAC    â¢ Total knee replacement Left 2018     Family History   Problem Relation Age of Onset   â¢ Heart disease Father    â¢ Hypertension Father    â¢ Hyperlipidemia Father    â¢ Heart disease Brother    â¢ Hypertension Brother      ALLERGIES:   Allergen Reactions   â¢ Oxycodone DIZZINESS   â¢ Oxycodone-Acetaminophen GI UPSET       Social History     Social History Narrative   â¢ Not on file   , lives [address] [facility] ([facility]), family in area including [place] [facility]. Current Outpatient Medications   Medication Sig Dispense Refill   â¢ Loratadine-Pseudoephedrine (CLARITIN-D 24 HOUR PO)      â¢ losartan (COZAAR) 25 MG tablet Take 1 tablet by mouth daily. Replaces Hydrochlorothiazide 90 tablet 1   â¢ capecitabine (XELODA) 500 MG tablet Take 3 tablets by mouth 2 times daily for 14 days followed by 7 days off. 84 tablet 0   â¢ clonazePAM (KlonoPIN) 2 MG tablet Take 1 tablet by mouth 45 minutes prior to MRI. Can repeat immediately prior if needed 2 tablet 0   â¢ fluticasone (FLONASE) 50 MCG/ACT nasal spray Spray 2 sprays in each nostril daily. 16 g 2   â¢ alendronate (Fosamax) 70 MG tablet Take 1 tablet by mouth every 7 days. 12 tablet 3   â¢ Calcium Carbonate-Vitamin D (CALTRATE 600+D PO) Take by mouth daily. â¢ MELATONIN ER PO Take 2 mg by mouth nightly. â¢ famotidine (PEPCID) 20 MG tablet Take 1 tablet by mouth 2 times daily. 180 tablet 1   â¢ prochlorperazine (COMPAZINE) 10 MG tablet Take 1 tablet by mouth every 6 hours as needed for Nausea or Vomiting. 30 tablet 3   â¢ Combivent Respimat  MCG/ACT inhaler Inhale 1 puff into the lungs every 4 hours as needed for shortness of breath. (Patient taking differently: Inhale 1 puff into the lungs 3 times daily.) 4 g 1   â¢ clopidogrel (PLAVIX) 75 MG tablet Take 1 tablet by mouth daily 90 tablet 0   â¢ clobetasol (TEMOVATE) 0.05 % ointment Apply topically twice a day to thumbnail and surrounding skin (Patient taking differently: as needed. Apply topically twice a day to thumbnail and surrounding skin) 45 g 0   â¢ Breo Ellipta 100-25 MCG/INH inhaler Inhale 1 puff into the lungs daily. 180 each 1   â¢ atorvastatin (LIPITOR) 40 MG tablet Take 1 tablet by mouth daily. 90 tablet 1   â¢ amoxicillin (AMOXIL) 500 MG capsule Take 4 capsules by mouth prior to dental procedures. 4 capsule 1   â¢ Soft Lens Products (Saline) 0.9 % Solution Place 1 drop into both eyes as needed (Use as needed for dryness). 5 mL 11   â¢ Cholecalciferol 125 mcg (5,000 units) chew tab Chew by mouth daily. Gummies     â¢ acetaminophen (TYLENOL) 500 MG tablet Take 1,000 mg by mouth daily. â¢ acetaminophen (TYLENOL) 650 MG CR tablet Take 650 mg by mouth 3 times daily as needed for Pain. â¢ tiZANidine (ZANAFLEX) 2 MG tablet Take 1 tablet by mouth every 8 hours as needed for muscle spasms. Do not operate heavy machinery or drive with medication 30 tablet 3   â¢ Lactobacillus (FLORAJEN ACIDOPHILUS PO) Take 1 tablet by mouth daily. â¢ Ascorbic Acid (VITAMIN C) 500 MG chewable tablet Chew 500 mg by mouth daily. â¢ BIOTIN PO Take 1 tablet by mouth daily. Hair, skin, nails 2500 mcg     â¢ MULTIPLE VITAMIN PO Take 2 tablets by mouth daily. gummies - Centrum Multiple vitamin       No current facility-administered medications for this visit. REVIEW OF SYSTEMS:  Reviewed from nurseâs notes and concur. PHYSICAL EXAM:  Visit Vitals  /68   Pulse (!) 56   Temp 97.6 Â°F (36.4 Â°C)   Resp 16   Wt 74.8 kg (165 lb)   SpO2 100%   BMI 30.18 kg/mÂ²     ECOG [06/23/22 1310]   ECOG Performance Status 1      General Appearance - Alert, well appearing, and in no distress. Mental Status - Alert, oriented to person, place, and time. Eyes - Pupils equal and reactive, extraocular eye movements intact.    Skin - diffuse erythema, fissures affecting bilateral fingertips, thumbs      Previous exam elements, not performed today:  Breasts - right breast with diffuse associated erythema, superficial skin nodules, induration with associated peau de orange. Supraclavicular lymphadenopathy noted. Mouth - Mucous membranes moist, pharynx normal without lesions. Neck - Supple, no significant adenopathy. Lymphatics - No palpable lymphadenopathy. Chest - Clear to auscultation, no wheezes, rales or rhonchi, symmetric air entry. Heart - Normal rate, regular rhythm, normal S1, S2, no murmurs, rubs, clicks or gallops. Neurological - Alert, oriented, normal speech, no focal findings or movement disorder noted. Musculoskeletal - No joint tenderness, deformity or swelling. Extremities - Peripheral pulses normal, no pedal edema, no clubbing or cyanosis. Labs reviewed and discussed with patient:  WBC (K/mcL)   Date Value   06/23/2022 3.5 (L)     RBC (mil/mcL)   Date Value   06/23/2022 3.06 (L)     HCT (%)   Date Value   06/23/2022 30.1 (L)     HGB (g/dL)   Date Value   06/23/2022 10.1 (L)     PLT (K/mcL)   Date Value   06/23/2022 246      Sodium (mmol/L)   Date Value   06/23/2022 135     Potassium (mmol/L)   Date Value   06/23/2022 4.4     Chloride (mmol/L)   Date Value   06/23/2022 102     Glucose (mg/dL)   Date Value   06/23/2022 101 (H)     Calcium (mg/dL)   Date Value   06/23/2022 8.8     Carbon Dioxide (mmol/L)   Date Value   06/23/2022 26     BUN (mg/dL)   Date Value   06/23/2022 14     Creatinine (mg/dL)   Date Value   06/23/2022 0.64       GOT/AST (Units/L)   Date Value   06/23/2022 29     GPT/ALT (Units/L)   Date Value   06/23/2022 30     No results found for: GGTP  Alkaline Phosphatase (Units/L)   Date Value   06/23/2022 51     Bilirubin, Total (mg/dL)   Date Value   06/23/2022 1.0     Imaging results reviewed and discussed with patient, as discussed above as per HPI, and as follows:  PET/CT 6/16/20: IMPRESSION: Several scattered subtle mildly hypermetabolic hepatic foci,  physiologic heterogeneity versus metastatic disease. Â   5/13/21 - CT C/A/P -   IMPRESSION:  1.   Negative for suspicious findings to suggest metastatic disease in the  chest, abdomen/pelvis. Â   2. Unchanged appearances of diffuse colonic diverticulosis, moderate  hiatal hernia, severe right shoulder osteoarthritis. Â   3. Postsurgical changes in right breast and right axilla. Correlate with  recent mammography for evaluation of the breast parenchyma    ASSESSMENT AND PLAN:  In summary, Kendy Cuevas is a 82year old female with a history of metastatic breast cancer, initially diagnosed 2008 now with recurrence 2015, treated with Ibrance/letrozole, presenting today in follow-up. Unfortunately, she is now experiencing clinical signs/symptoms suggestive of progressive disease, and she has therefore initiated treatment with Xeloda. Thus far, she seems to be tolerating treatment fairly well, we discussed consideration for continued therapy, as she is currently receiving. We did discuss at length supportive measures, including use of topical lotion/therapy for erythrodysesthesia, and for now she does not require additional antidiarrheal/anti nausea support. Discussed oral chemotherapy adherence and side effects with patient. Patient is taking medication as prescribed. Discussed with patient the natural history, course and prognosis of illness. Therapeutic options discussed and explained. Side effects, risks and benefits, and alternatives discussed. Patient acknowledges understanding of disease and treatment plan. Will continue to follow closely with her with regards to tolerance of treatment, all questions answered to her satisfaction.      Corrinne Spice, MD

## 2024-08-25 NOTE — Clinical Note
[facility]  [address]  [place] [address]    Evelio Valerio accompanied Digna Ibarra to the emergency department on 8/25/2024. They may return to work on 08/26/2024.        Thank you for choosing [facility].    Mic Calvillo MD

## 2024-08-25 NOTE — Clinical Note
[facility]  [address]  [place] [address]    Evelio Valerio accompanied Digna Ibarra to the emergency department on 8/25/2024. They may return to work on 08/26/2024.        Thank you for choosing [facility].    Mic Calvillo MD

## 2024-08-25 NOTE — Clinical Note
[facility]  [address]  [place] [address]    Evelio Valerio accompanied Digna Ibarra to the emergency department on 8/25/2024. They may return to work on 08/26/2024.        Thank you for choosing [facility].    Mic Calvillo MD

## 2024-08-25 NOTE — DISCHARGE INSTRUCTIONS
Today your laboratory evaluation and CAT scan are reassuring.    I did send in 2 medications that you may take for recurrent symptoms if needed.    I did give you a note for tomorrow to use as necessary.    Please read all of the instructions in this handout.  If you receive prescriptions please fill them and take them as directed.  Please call your primary care physician for follow-up appointment in the next 5 to 7 days.  If you do not have a physician you may call the [facility] referral line at [phone].    You may return to the emergency department at any time for any concerns such as worsening symptoms.  If you received a work or school note it will be printed at the back of this packet.

## 2024-08-25 NOTE — Clinical Note
[facility]  [address]  [place] [address]    Digna Ibarra was seen and treated in our emergency department on 8/25/2024.  She may return to work on 08/27/2024.         Thank you for choosing [facility].    Mic Calvillo MD

## 2024-08-25 NOTE — ED TRIAGE NOTES
Pt reports numbness on her left side since 0200 this morning. Pt denies any pain at present, reports similar episode happened last year and she was diagnosed with complex migraines.

## 2024-08-25 NOTE — FSED PROVIDER NOTE
EMERGENCY DEPARTMENT ENCOUNTER    Room Number:  06/06  Date seen:  8/25/2024  Time seen: 04:05 EDT  PCP: Vania Sherwood MD  Historian:     Discussed/obtained information from independent historians:     HPI:    Patient is a 33-year-old female.  She does have a history of complex migraine headache with left-sided numbness and weakness.  She presents at this time with onset of numbness in her left face and arm that began at approximately 2 AM this morning.  She states it started in her chest and progressed to her face and arm.  No change in speech.  She does have occasional visual complaints.  No trauma.  No fever no chills.  No facial palsy.    I did review the patient's past medical history specifically MRI of the brain, MRI of the cervical spine, MRI of the orbit, MRI of the thoracic spine dated 2/27/2023 and 2/28/2023.  Patient also underwent CTA of head and neck on 2/27/2023 which revealed no evidence of stenosis.    External (non-ED) record review:        Chronic or social conditions impacting care:    ALLERGIES  Patient has no known allergies.    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    Abnormal MRI, neck 03/30/2023    Other hemorrhoids 03/30/2023    Intractable migraine without aura and without status migrainosus 03/30/2023    Dizziness 06/14/2023    Uncomplicated asthma 07/24/2023     Resolved Ambulatory Problems     Diagnosis Date Noted    Iron deficiency anemia secondary to inadequate dietary iron intake 07/28/2022    Costochondral chest pain 09/26/2022     Past Medical History:   Diagnosis Date    Acid reflux     Asthma     Chest pain     HL (hearing loss)      Migraines        PAST SURGICAL HISTORY  Past Surgical History:   Procedure Laterality Date    WISDOM TOOTH EXTRACTION Bilateral        FAMILY HISTORY  Family History   Adopted: Yes       SOCIAL HISTORY  Social History     Socioeconomic History    Marital status:     Number of children: 0   Tobacco Use    Smoking status: Never     Passive exposure: Never    Smokeless tobacco: Never    Tobacco comments:     Caffeine use minimal   Vaping Use    Vaping status: Never Used   Substance and Sexual Activity    Alcohol use: Never    Drug use: Defer    Sexual activity: Defer       REVIEW OF SYSTEMS  Review of Systems    All systems reviewed and negative except for those discussed in HPI.       PHYSICAL EXAM    I have reviewed the triage vital signs and nursing notes.  Vitals:    08/25/24 0415   BP: 101/70   Pulse: 57   Resp:    Temp:    SpO2: 100%     Physical Exam    Vital signs: Reviewed in nurses notes    General: Awake alert no acute distress    HEENT: Normocephalic atraumatic nasopharynx is clear.  Oropharynx is clear and moist    Neck:   Supple, no elevation of JVD, no bruits noted    Respiratory:   Nonlabored respirations.  Clear to auscultation bilaterally.  Equal breath sounds bilaterally.  No wheezes or stridor noted.    Cardiovascular: Regular rate and rhythm.  No murmur.  Equal pulses in bilateral lower extremities without edema.    Abdomen: Nondistended    Skin:   Warm and dry.  No rashes noted    Neurological examination: Patient is awake alert oriented x 4.  Speech is intact.  No facial palsy.  Strength is 5/5 in right upper and right lower extremity, 4+/5 left upper extremity, 4+/5 left lower extremity.Sensation subjectively mildly decreased over left face and left upper extremity.  LAB RESULTS  Recent Results (from the past 24 hour(s))   ECG 12 Lead Stroke Evaluation    Collection Time: 08/25/24  3:40 AM   Result Value Ref Range    QT Interval 396 ms    QTC Interval 399 ms   POC Glucose Once     Collection Time: 08/25/24  3:43 AM    Specimen: Blood   Result Value Ref Range    Glucose 83 70 - 130 mg/dL   Comprehensive Metabolic Panel    Collection Time: 08/25/24  4:10 AM    Specimen: Blood   Result Value Ref Range    Glucose 97 65 - 99 mg/dL    BUN 8 6 - 20 mg/dL    Creatinine 0.81 0.57 - 1.00 mg/dL    Sodium 135 (L) 136 - 145 mmol/L    Potassium 4.0 3.5 - 5.2 mmol/L    Chloride 103 98 - 107 mmol/L    CO2 24.5 22.0 - 29.0 mmol/L    Calcium 9.1 8.6 - 10.5 mg/dL    Total Protein 6.8 6.0 - 8.5 g/dL    Albumin 4.1 3.5 - 5.2 g/dL    ALT (SGPT) 11 1 - 33 U/L    AST (SGOT) 14 1 - 32 U/L    Alkaline Phosphatase 67 39 - 117 U/L    Total Bilirubin 0.3 0.0 - 1.2 mg/dL    Globulin 2.7 gm/dL    A/G Ratio 1.5 g/dL    BUN/Creatinine Ratio 9.9 7.0 - 25.0    Anion Gap 7.5 5.0 - 15.0 mmol/L    eGFR 99.7 >60.0 mL/min/1.73   hCG, Serum, Qualitative    Collection Time: 08/25/24  4:10 AM    Specimen: Blood   Result Value Ref Range    HCG Qualitative Negative Negative   Magnesium    Collection Time: 08/25/24  4:10 AM    Specimen: Blood   Result Value Ref Range    Magnesium 2.0 1.6 - 2.6 mg/dL   Single High Sensitivity Troponin T    Collection Time: 08/25/24  4:10 AM    Specimen: Blood   Result Value Ref Range    HS Troponin T <6 <14 ng/L   CBC Auto Differential    Collection Time: 08/25/24  4:10 AM    Specimen: Blood   Result Value Ref Range    WBC 6.38 3.40 - 10.80 10*3/mm3    RBC 3.91 3.77 - 5.28 10*6/mm3    Hemoglobin 12.0 12.0 - 15.9 g/dL    Hematocrit 36.4 34.0 - 46.6 %    MCV 93.1 79.0 - 97.0 fL    MCH 30.7 26.6 - 33.0 pg    MCHC 33.0 31.5 - 35.7 g/dL    RDW 12.4 12.3 - 15.4 %    RDW-SD 43.1 37.0 - 54.0 fl    MPV 10.1 6.0 - 12.0 fL    Platelets 208 140 - 450 10*3/mm3    Neutrophil % 55.4 42.7 - 76.0 %    Lymphocyte % 34.2 19.6 - 45.3 %    Monocyte % 8.2 5.0 - 12.0 %    Eosinophil % 1.9 0.3 - 6.2 %    Basophil % 0.3 0.0 - 1.5 %    Immature Grans % 0.0 0.0 - 0.5 %    Neutrophils, Absolute 3.54 1.70 - 7.00 10*3/mm3     Lymphocytes, Absolute 2.18 0.70 - 3.10 10*3/mm3    Monocytes, Absolute 0.52 0.10 - 0.90 10*3/mm3    Eosinophils, Absolute 0.12 0.00 - 0.40 10*3/mm3    Basophils, Absolute 0.02 0.00 - 0.20 10*3/mm3    Immature Grans, Absolute 0.00 0.00 - 0.05 10*3/mm3       Ordered the above labs and independently interpreted results.  My findings will be discussed in the ED course or medical decision making section below      PROCEDURES:  ECG 12 Lead      Date/Time: 8/25/2024 3:40 AM    Performed by: Mic Calvillo MD  Authorized by: Mic Calvillo MD  Interpreted by ED physician  Rhythm: sinus rhythm  Comments: Normal sinus rhythm rate of 61.  PACs noted.  No acute ischemia          RADIOLOGY RESULTS  CT Head Without Contrast    Result Date: 8/25/2024  Patient: AZEEM INGRAM  Time Out: 05:19 Exam(s): CT HEAD Without Contrast EXAM:   CT Head Without Intravenous Contrast CLINICAL HISTORY:    Reason for exam: numbess left face arm, h o complex migraine ha. TECHNIQUE:   Axial computed tomography images of the head brain without intravenous contrast.  This CT exam was performed according to the principle of ALARA (As Low As Reasonably Achievable) by using one or more of the following dose reduction techniques: automated exposure control, adjustment of the mA and or kV according to patient size, and or use of iterative reconstruction technique. COMPARISON:   No relevant prior studies available. FINDINGS: No acute intracranial hemorrhage.  No midline shift or mass effect. The territorial gray-white matter differentiation is maintained throughout. The ventricles and sulci are commensurate with age. The visualized orbits appear grossly unremarkable. The calvarium is intact. The visualized paranasal sinuses and mastoid air cells are grossly clear. IMPRESSION: No acute intracranial hemorrhage, midline shift, or mass effect.     Electronically signed by Thomas Chand MD on 08-25-24 at 0519      Ordered the above noted radiological  studies.  Independently interpreted by me.  My findings will be discussed in the medical decision section below.     PROGRESS, DATA ANALYSIS, CONSULTS AND MEDICAL DECISION MAKING    Please note that this section constitutes my independent interpretation of clinical data including lab results, radiology, EKG's.  This constitutes my independent professional opinion regarding differential diagnosis and management of this patient.  It may include any factors such as history from outside sources, review of external records, social determinants of health, management of medications, response to those treatments, and discussions with other providers.    ED Course as of 08/25/24 0528   Sun Aug 25, 2024   0524 Patient states he feels much better after treatment.  Numbness has resolved.  Currently no focal deficits.  CT unremarkable [TC]      ED Course User Index  [TC] Mic Calvillo MD     Orders placed during this visit:  Orders Placed This Encounter   Procedures    CT Head Without Contrast    Comprehensive Metabolic Panel    hCG, Serum, Qualitative    Magnesium    Single High Sensitivity Troponin T    CBC Auto Differential    POC Glucose STAT    POC Glucose Once    ECG 12 Lead Stroke Evaluation    ECG 12 Lead    CBC & Differential       Medications   droperidol (INAPSINE) injection 1.25 mg (1.25 mg Intravenous Given 8/25/24 0415)   diphenhydrAMINE (BENADRYL) injection 25 mg (25 mg Intravenous Given 8/25/24 0418)   ketorolac (TORADOL) injection 30 mg (30 mg Intravenous Given 8/25/24 0421)   dexAMETHasone sodium phosphate injection 10 mg (10 mg Intravenous Given 8/25/24 0417)   sodium chloride 0.9 % bolus 500 mL (0 mL Intravenous Stopped 8/25/24 0500)            Medical Decision Making  Problems Addressed:  Hemiplegic migraine without status migrainosus, not intractable: complicated acute illness or injury    Amount and/or Complexity of Data Reviewed  Labs: ordered.  Radiology: ordered.  ECG/medicine tests:  ordered.    Risk  Prescription drug management.            DIAGNOSIS  Final diagnoses:   Hemiplegic migraine without status migrainosus, not intractable          Medication List        New Prescriptions      butalbital-acetaminophen-caffeine -40 MG per tablet  Commonly known as: FIORICET, ESGIC  1-2 tabs po q6h prn severe headache     prochlorperazine 10 MG tablet  Commonly known as: COMPAZINE  Take 1 tablet by mouth Every 8 (Eight) Hours As Needed for Nausea or Vomiting (with headache).               Where to Get Your Medications        These medications were sent to [facility] [address] [place] [address] - [id number] [facility] - [phone]  - [phone]   [address] [facility] [address]      Phone: [phone]   butalbital-acetaminophen-caffeine -40 MG per tablet  prochlorperazine 10 MG tablet         FOLLOW-UP  Vania Sherwood MD  [address] [id number] [phone]    Schedule an appointment as soon as possible for a visit           Latest Documented Vital Signs:  As of 05:28 EDT  BP- 101/70 HR- 57 Temp- 98.1 °F (36.7 °C) (Oral) O2 sat- 100%    Appropriate PPE utilized throughout this patient encounter to include mask, if indicated, per current protocol. Hand hygiene was performed before donning PPE and after removal when leaving the room.    Please note that portions of this were completed with a voice recognition program.     Note Disclaimer: At [facility], we believe that sharing information builds trust and better relationships. You are receiving this note because you are receiving care at [facility] or recently visited. It is possible you will see health information before a provider has talked with you about it. This kind of information can be easy to misunderstand. To help you fully understand what it means for your health, we urge you to discuss this note with your provider.

## 2024-09-10 ENCOUNTER — OFFICE VISIT (OUTPATIENT)
Dept: FAMILY MEDICINE CLINIC | Facility: CLINIC | Age: 34
End: 2024-09-10
Payer: COMMERCIAL

## 2024-09-10 VITALS
TEMPERATURE: 98.4 F | HEART RATE: 82 BPM | HEIGHT: 63 IN | OXYGEN SATURATION: 99 % | DIASTOLIC BLOOD PRESSURE: 72 MMHG | SYSTOLIC BLOOD PRESSURE: 106 MMHG | WEIGHT: 139.9 LBS | RESPIRATION RATE: 16 BRPM | BODY MASS INDEX: 24.79 KG/M2

## 2024-09-10 DIAGNOSIS — R10.2 CHRONIC PELVIC PAIN IN FEMALE: Primary | ICD-10-CM

## 2024-09-10 DIAGNOSIS — G89.29 CHRONIC PELVIC PAIN IN FEMALE: Primary | ICD-10-CM

## 2024-09-10 DIAGNOSIS — G43.809 OTHER MIGRAINE WITHOUT STATUS MIGRAINOSUS, NOT INTRACTABLE: ICD-10-CM

## 2024-09-10 DIAGNOSIS — R11.2 NAUSEA AND VOMITING, UNSPECIFIED VOMITING TYPE: ICD-10-CM

## 2024-09-10 DIAGNOSIS — R10.2 PELVIC PRESSURE IN FEMALE: ICD-10-CM

## 2024-09-10 LAB
BILIRUB BLD-MCNC: NEGATIVE MG/DL
CLARITY, POC: CLEAR
COLOR UR: YELLOW
EXPIRATION DATE: NORMAL
GLUCOSE UR STRIP-MCNC: NEGATIVE MG/DL
KETONES UR QL: NEGATIVE
LEUKOCYTE EST, POC: NEGATIVE
Lab: NORMAL
NITRITE UR-MCNC: NEGATIVE MG/ML
PH UR: 6 [PH] (ref 5–8)
PROT UR STRIP-MCNC: NEGATIVE MG/DL
RBC # UR STRIP: NEGATIVE /UL
SP GR UR: 1.02 (ref 1–1.03)
UROBILINOGEN UR QL: NORMAL

## 2024-09-10 PROCEDURE — 99214 OFFICE O/P EST MOD 30 MIN: CPT | Performed by: STUDENT IN AN ORGANIZED HEALTH CARE EDUCATION/TRAINING PROGRAM

## 2024-09-10 PROCEDURE — 81003 URINALYSIS AUTO W/O SCOPE: CPT | Performed by: STUDENT IN AN ORGANIZED HEALTH CARE EDUCATION/TRAINING PROGRAM

## 2024-09-10 RX ORDER — ONDANSETRON 4 MG/1
4 TABLET, FILM COATED ORAL EVERY 8 HOURS PRN
Qty: 8 TABLET | Refills: 0 | Status: SHIPPED | OUTPATIENT
Start: 2024-09-10

## 2024-09-10 NOTE — PROGRESS NOTES
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    She began experiencing pelvic pain a week before her menstrual cycle, around 08/28/2024. The pain subsided during her period, which started on 09/03/2024 and ended on 09/05/2024. However, she noticed black spotting on the following Saturday, accompanied by nausea and a burning sensation in her lower abdomen. She has no urinary issues but reports pressure in her lower abdomen. A CT scan conducted a year ago showed no abnormalities. She was previously diagnosed with Pelvic Inflammatory Disease (PID) and treated with antibiotics, which initially alleviated her symptoms. However, the symptoms have since returned. She reports no pain during sexual activity or any vaginal discharge. A cyst detected in 06/2023 has since resolved. Her menstrual cycle has been irregular, with periods occurring on the 19th last year, the 5th last month, the 3rd this month, and the 7th the month before that.    She also suffers from migraines, which are particularly severe during her menstrual cycle. She had to take time off work due to the intensity of the pain. During a recent ER visit, she experienced numbness on the left side of her body. She requests [employer] paperwork for 20 days from 09/2024 to 12/2024. She manages her migraines with ibuprofen and has consulted with a neurologist, Dr. Matamoros, who recommended magnesium and riboflavin supplements.       Digna Ibarra presents to [facility]  Pelvic Pain  The patient's primary symptoms include pelvic pain. Associated symptoms include nausea.   Migraine  Nausea  Associated symptoms include nausea.       Objective   Vital Signs:  /72 (BP Location: Left arm, Patient Position: Sitting, Cuff  "Size: Adult)   Pulse 82   Temp 98.4 °F (36.9 °C) (Oral)   Resp 16   Ht 160 cm (63\")   Wt 63.5 kg (139 lb 14.4 oz)   SpO2 99%   BMI 24.78 kg/m²   Estimated body mass index is 24.78 kg/m² as calculated from the following:    Height as of this encounter: 160 cm (63\").    Weight as of this encounter: 63.5 kg (139 lb 14.4 oz).    BMI is within normal parameters. No other follow-up for BMI required.      Physical Exam  Cardiovascular:      Rate and Rhythm: Normal rate.      Pulses: Normal pulses.      Heart sounds: Normal heart sounds.   Pulmonary:      Effort: Pulmonary effort is normal.      Breath sounds: Normal breath sounds.   Abdominal:      General: Abdomen is flat. Bowel sounds are normal.      Palpations: Abdomen is soft.      Tenderness: There is no abdominal tenderness.   Skin:     General: Skin is warm.   Neurological:      Mental Status: She is alert and oriented to person, place, and time.        Result Review :                Assessment and Plan   Diagnoses and all orders for this visit:    1. Chronic pelvic pain in female (Primary)    2. Pelvic pressure in female  -     POCT urinalysis dipstick, automated  -     US Non-ob Transvaginal    3. Nausea and vomiting, unspecified vomiting type  -     ondansetron (Zofran) 4 MG tablet; Take 1 tablet by mouth Every 8 (Eight) Hours As Needed for Nausea or Vomiting.  Dispense: 8 tablet; Refill: 0    4. Other migraine without status migrainosus, not intractable      Recent Labs     09/10/24  1635   CLARITYU Clear   GLUCOSEUR Negative   BILIRUBINUR Negative   KETONESU Negative   SPECGRAV 1.025   RBCUR Negative   PHUR 6.0   PROTEINPOCUA Negative   UROBILINOGEN Normal   LEUKOCYTESUR Negative   NITRITE Negative          Assessment & Plan  1. Pelvic pain.  Her Pap smear conducted in 2023 was normal. Both urine and blood pregnancy tests were negative. The bleeding she experienced was consistent with her regular menstrual cycle. A urine dipstick test will be performed " to rule out a urinary tract infection. A transvaginal ultrasound will be ordered to evaluate the uterus and ovaries. Zofran 8 mg will be prescribed for nausea, to be used only when nausea and vomiting are severe. If the urine test indicates an infection, antibiotics will be prescribed and sent to [facility] pharmacy in [place]. She is advised to follow up with her OBGYN for further evaluation if needed.    2. Migraine.  [place] paperwork will be completed and sent to the provided fax number. She reports taking ibuprofen for migraine management. Previous recommendations from her neurologist included magnesium and riboflavin supplements. She is advised to continue with these supplements and monitor her symptoms.                Follow Up   No follow-ups on file.  Patient was given instructions and counseling regarding her condition or for health maintenance advice. Please see specific information pulled into the AVS if appropriate.     Patient or patient representative verbalized consent for the use of Ambient Listening during the visit with  Vania Sherwood MD for chart documentation. 9/10/2024  16:42 EDT

## 2024-10-18 ENCOUNTER — TELEPHONE (OUTPATIENT)
Dept: FAMILY MEDICINE CLINIC | Facility: CLINIC | Age: 34
End: 2024-10-18

## 2024-10-18 NOTE — TELEPHONE ENCOUNTER
Caller: Digna Ibarra    Relationship: Self    Best call back number: [phone]     What was the call regarding: PATIENT STATED THAT DR. ELI PUT IN AN ORDER FOR AN ULTRASOUND AND WHEN SHE CALLED TO SCHEDULE, THEY REQUESTED THAT DR. ELI PUT IN AN ADDITIONAL ORDER FOR AN ULTRASOUND OF PELVIC AREA. PATIENT IS UNABLE TO SCHEDULE UNTIL THEY RECEIVE THIS ORDER. PLEASE ADVISE.

## 2024-11-04 ENCOUNTER — TELEPHONE (OUTPATIENT)
Dept: OBSTETRICS AND GYNECOLOGY | Age: 34
End: 2024-11-04
Payer: COMMERCIAL

## 2024-11-04 NOTE — TELEPHONE ENCOUNTER
Caller: Digna Ibarra    Relationship to patient: Self    Best call back number: [phone]    Chief complaint: NEEDS APPT [facility] 11/5 OR 11/11 IF POSSIBLE     Type of visit: U/S AND GYN FOLLOW UP     Requested date: 11/5 OR 11/11     If rescheduling, when is the original appointment: 11/13

## 2024-11-13 ENCOUNTER — OFFICE VISIT (OUTPATIENT)
Dept: OBSTETRICS AND GYNECOLOGY | Age: 34
End: 2024-11-13
Payer: COMMERCIAL

## 2024-11-13 VITALS
DIASTOLIC BLOOD PRESSURE: 64 MMHG | HEIGHT: 63 IN | WEIGHT: 146 LBS | BODY MASS INDEX: 25.87 KG/M2 | SYSTOLIC BLOOD PRESSURE: 110 MMHG

## 2024-11-13 DIAGNOSIS — Z13.89 SCREENING FOR BLOOD OR PROTEIN IN URINE: Primary | ICD-10-CM

## 2024-11-13 DIAGNOSIS — N94.89 VAGINAL BURNING: ICD-10-CM

## 2024-11-13 DIAGNOSIS — R30.0 DYSURIA: ICD-10-CM

## 2024-11-13 DIAGNOSIS — Z87.42 HISTORY OF RECURRENT VAGINAL DISCHARGE: ICD-10-CM

## 2024-11-13 LAB
BILIRUB BLD-MCNC: NEGATIVE MG/DL
CLARITY, POC: CLEAR
COLOR UR: YELLOW
GLUCOSE UR STRIP-MCNC: NEGATIVE MG/DL
KETONES UR QL: NEGATIVE
LEUKOCYTE EST, POC: NEGATIVE
NITRITE UR-MCNC: NEGATIVE MG/ML
PH UR: 6.5 [PH] (ref 5–8)
PROT UR STRIP-MCNC: NEGATIVE MG/DL
RBC # UR STRIP: ABNORMAL /UL
SP GR UR: 1.01 (ref 1–1.03)
UROBILINOGEN UR QL: NORMAL

## 2024-11-13 NOTE — PROGRESS NOTES
"Subjective     Chief Complaint   Patient presents with    Pelvic Pain     New pt c/o pelvic pain. Ultrasound done today. , has been going on for awhile and it comes and goes.  Sometimes itchy/burning.  Was seen in a clinic recently and labs were normal.       Digna Ibarra is a 33 y.o.  whose LMP is Patient's last menstrual period was 2024. presents with vaginal burning    Has noted pain in pelvic area for a year  Started feeling worse in August  Did see pcp and has been seen at urgent care and ER  I reviewed all the results and everything is negative  UA's were done but no cultures were sent  We will send one today  NuSwab VG+ - Swab, Vagina (2024 17:20)   Urinalysis, Microscopic Only - Urine, Clean Catch (2024 16:54)   Pregnancy, Urine - Urine, Clean Catch (2024 16:54)     Has brown d/c  Unsure if caused by her bartholin cyst  Has had a cyst for 10 years, does not bother her    She is noting some changes with her period  It only lasts 3 days now, used to last longer   Approx 28-32    She is SA and not using BC  She is not planning for pregnancy   No Additional Complaints Reported    The following portions of the patient's history were reviewed and updated as appropriate:no additional history reviewed, vital signs, allergies, current medications, past medical history, past social history, past surgical history, and problem list      Review of Systems   Genitourinary:positive for vaginal pain and dysuria     Objective      /64   Ht 160 cm (63\")   Wt 66.2 kg (146 lb)   LMP 2024   BMI 25.86 kg/m²     Physical Exam    General:   alert, comfortable, and no distress   Heart: Not performed today   Lungs: Not performed today.   Breast: Not performed today   Neck: Not performed today   Abdomen: Not performed today   CVA: Not performed today   Pelvis: External genitalia: normal general appearance, Bartholin's mass, and on the right   Vaginal: normal mucosa without prolapse " or lesions, discharge, scant, brown/white,  Cervix: normal appearance  Adnexa: normal bimanual exam  Uterus: normal single, nontender   Extremities: Not performed today   Neurologic: negative   Psychiatric: Normal affect, judgement, and mood       Lab Review   Labs: Urinalysis - with micro    Imaging   Ultrasound - Pelvic Vaginal    Assessment & Plan     ASSESSMENT  1. Screening for blood or protein in urine    2. Dysuria    3. History of recurrent vaginal discharge    4. Vaginal burning          PLAN  1.   Orders Placed This Encounter   Procedures    Urine Culture - Urine, Urine, Random Void    POC Urinalysis Dipstick, Multipro       2. Send urine culture and  nxgen. Will call with results.     Follow up: REBEKAH Clement  11/13/2024

## 2024-11-15 LAB
BACTERIA UR CULT: NORMAL
BACTERIA UR CULT: NORMAL

## 2024-11-18 RX ORDER — FLUCONAZOLE 150 MG/1
TABLET ORAL
Qty: 1 TABLET | Refills: 0 | Status: SHIPPED | OUTPATIENT
Start: 2024-11-18 | End: 2024-11-22

## 2024-11-18 RX ORDER — METRONIDAZOLE 500 MG/1
500 TABLET ORAL 2 TIMES DAILY
Qty: 14 TABLET | Refills: 0 | Status: SHIPPED | OUTPATIENT
Start: 2024-11-18 | End: 2024-11-22

## 2024-11-20 ENCOUNTER — LAB (OUTPATIENT)
Dept: FAMILY MEDICINE CLINIC | Facility: CLINIC | Age: 34
End: 2024-11-20
Payer: COMMERCIAL

## 2024-11-20 DIAGNOSIS — Z00.00 ENCOUNTER FOR ANNUAL PHYSICAL EXAM: Primary | ICD-10-CM

## 2024-11-20 RX ORDER — TERCONAZOLE 0.4 %
1 CREAM WITH APPLICATOR VAGINAL NIGHTLY
Qty: 45 G | Refills: 1 | Status: SHIPPED | OUTPATIENT
Start: 2024-11-20 | End: 2024-12-04

## 2024-11-21 ENCOUNTER — TELEPHONE (OUTPATIENT)
Dept: OBSTETRICS AND GYNECOLOGY | Age: 34
End: 2024-11-21
Payer: COMMERCIAL

## 2024-11-21 LAB
ALBUMIN SERPL-MCNC: 4.3 G/DL (ref 3.5–5.2)
ALBUMIN/GLOB SERPL: 1.5 G/DL
ALP SERPL-CCNC: 64 U/L (ref 39–117)
ALT SERPL-CCNC: 11 U/L (ref 1–33)
APPEARANCE UR: CLEAR
AST SERPL-CCNC: 17 U/L (ref 1–32)
BACTERIA #/AREA URNS HPF: ABNORMAL /HPF
BASOPHILS # BLD AUTO: 0.02 10*3/MM3 (ref 0–0.2)
BASOPHILS NFR BLD AUTO: 0.4 % (ref 0–1.5)
BILIRUB SERPL-MCNC: 0.5 MG/DL (ref 0–1.2)
BILIRUB UR QL STRIP: NEGATIVE
BUN SERPL-MCNC: 7 MG/DL (ref 6–20)
BUN/CREAT SERPL: 8.8 (ref 7–25)
CALCIUM SERPL-MCNC: 9 MG/DL (ref 8.6–10.5)
CASTS URNS MICRO: ABNORMAL
CHLORIDE SERPL-SCNC: 107 MMOL/L (ref 98–107)
CHOLEST SERPL-MCNC: 157 MG/DL (ref 0–200)
CO2 SERPL-SCNC: 23.3 MMOL/L (ref 22–29)
COLOR UR: YELLOW
CREAT SERPL-MCNC: 0.8 MG/DL (ref 0.57–1)
EGFRCR SERPLBLD CKD-EPI 2021: 101.2 ML/MIN/1.73
EOSINOPHIL # BLD AUTO: 0.07 10*3/MM3 (ref 0–0.4)
EOSINOPHIL NFR BLD AUTO: 1.3 % (ref 0.3–6.2)
EPI CELLS #/AREA URNS HPF: ABNORMAL /HPF
ERYTHROCYTE [DISTWIDTH] IN BLOOD BY AUTOMATED COUNT: 12.1 % (ref 12.3–15.4)
GLOBULIN SER CALC-MCNC: 2.8 GM/DL
GLUCOSE SERPL-MCNC: 84 MG/DL (ref 65–99)
GLUCOSE UR QL STRIP: NEGATIVE
HCT VFR BLD AUTO: 38.8 % (ref 34–46.6)
HDLC SERPL-MCNC: 65 MG/DL (ref 40–60)
HGB BLD-MCNC: 13 G/DL (ref 12–15.9)
HGB UR QL STRIP: ABNORMAL
IMM GRANULOCYTES # BLD AUTO: 0.01 10*3/MM3 (ref 0–0.05)
IMM GRANULOCYTES NFR BLD AUTO: 0.2 % (ref 0–0.5)
KETONES UR QL STRIP: NEGATIVE
LDLC SERPL CALC-MCNC: 83 MG/DL (ref 0–100)
LDLC/HDLC SERPL: 1.3 {RATIO}
LEUKOCYTE ESTERASE UR QL STRIP: NEGATIVE
LYMPHOCYTES # BLD AUTO: 1.36 10*3/MM3 (ref 0.7–3.1)
LYMPHOCYTES NFR BLD AUTO: 25.5 % (ref 19.6–45.3)
MCH RBC QN AUTO: 31.7 PG (ref 26.6–33)
MCHC RBC AUTO-ENTMCNC: 33.5 G/DL (ref 31.5–35.7)
MCV RBC AUTO: 94.6 FL (ref 79–97)
MONOCYTES # BLD AUTO: 0.39 10*3/MM3 (ref 0.1–0.9)
MONOCYTES NFR BLD AUTO: 7.3 % (ref 5–12)
NEUTROPHILS # BLD AUTO: 3.49 10*3/MM3 (ref 1.7–7)
NEUTROPHILS NFR BLD AUTO: 65.3 % (ref 42.7–76)
NITRITE UR QL STRIP: NEGATIVE
NRBC BLD AUTO-RTO: 0 /100 WBC (ref 0–0.2)
PH UR STRIP: 7 [PH] (ref 5–8)
PLATELET # BLD AUTO: 225 10*3/MM3 (ref 140–450)
POTASSIUM SERPL-SCNC: 4.2 MMOL/L (ref 3.5–5.2)
PROT SERPL-MCNC: 7.1 G/DL (ref 6–8.5)
PROT UR QL STRIP: ABNORMAL
RBC # BLD AUTO: 4.1 10*6/MM3 (ref 3.77–5.28)
RBC #/AREA URNS HPF: ABNORMAL /HPF
SODIUM SERPL-SCNC: 136 MMOL/L (ref 136–145)
SP GR UR STRIP: 1.02 (ref 1–1.03)
TRIGL SERPL-MCNC: 39 MG/DL (ref 0–150)
TSH SERPL DL<=0.005 MIU/L-ACNC: 1.32 UIU/ML (ref 0.27–4.2)
UROBILINOGEN UR STRIP-MCNC: ABNORMAL MG/DL
VLDLC SERPL CALC-MCNC: 9 MG/DL (ref 5–40)
WBC # BLD AUTO: 5.34 10*3/MM3 (ref 3.4–10.8)
WBC #/AREA URNS HPF: ABNORMAL /HPF

## 2024-11-22 ENCOUNTER — OFFICE VISIT (OUTPATIENT)
Dept: FAMILY MEDICINE CLINIC | Facility: CLINIC | Age: 34
End: 2024-11-22
Payer: COMMERCIAL

## 2024-11-22 VITALS
RESPIRATION RATE: 16 BRPM | HEIGHT: 63 IN | DIASTOLIC BLOOD PRESSURE: 70 MMHG | WEIGHT: 147.2 LBS | OXYGEN SATURATION: 100 % | SYSTOLIC BLOOD PRESSURE: 100 MMHG | HEART RATE: 90 BPM | TEMPERATURE: 98 F | BODY MASS INDEX: 26.08 KG/M2

## 2024-11-22 DIAGNOSIS — R31.29 OTHER MICROSCOPIC HEMATURIA: ICD-10-CM

## 2024-11-22 DIAGNOSIS — R53.83 OTHER FATIGUE: ICD-10-CM

## 2024-11-22 DIAGNOSIS — K62.89 RECTAL DISCOMFORT: ICD-10-CM

## 2024-11-22 DIAGNOSIS — K21.9 GASTROESOPHAGEAL REFLUX DISEASE, UNSPECIFIED WHETHER ESOPHAGITIS PRESENT: ICD-10-CM

## 2024-11-22 DIAGNOSIS — Z00.00 ANNUAL PHYSICAL EXAM: Primary | ICD-10-CM

## 2024-11-22 DIAGNOSIS — G43.809 OTHER MIGRAINE WITHOUT STATUS MIGRAINOSUS, NOT INTRACTABLE: ICD-10-CM

## 2024-11-22 PROCEDURE — 99395 PREV VISIT EST AGE 18-39: CPT | Performed by: STUDENT IN AN ORGANIZED HEALTH CARE EDUCATION/TRAINING PROGRAM

## 2024-12-05 NOTE — PROGRESS NOTES
"Chief Complaint  Annual Exam and Abnormal Lab (From 11/20/2024.)    Subjective    History of Present Illness  The patient is a 33-year-old female who presents for a yearly physical.    She reports feeling well overall. She last visited in 09/2024, at which time she was experiencing pelvic pain and migraines.    She had been experiencing discomfort in her pelvic area, which was diagnosed as a yeast infection and bacterial vaginosis by her OB/GYN. She is currently undergoing treatment for these conditions and is using a cream for her bacterial vaginosis. Initially, she suspected a urinary tract infection (UTI) and underwent a swab test. She reports no symptoms of burning during urination or increased frequency of urination. She has no immediate plans to revisit her OB/GYN.    She also has small uterine fibroids. She experienced nausea about a week ago but is unsure if this is related to her other health issues.    FAMILY HISTORY  She is adopted and does not know about her family history.       Digna Ibarra presents to [facility]  History of Present Illness    Objective   Vital Signs:  /70 (BP Location: Left arm, Patient Position: Sitting, Cuff Size: Adult)   Pulse 90   Temp 98 °F (36.7 °C) (Oral)   Resp 16   Ht 160 cm (63\")   Wt 66.8 kg (147 lb 3.2 oz)   SpO2 100%   BMI 26.08 kg/m²   Estimated body mass index is 26.08 kg/m² as calculated from the following:    Height as of this encounter: 160 cm (63\").    Weight as of this encounter: 66.8 kg (147 lb 3.2 oz).    BMI is >= 25 and <30. (Overweight) The following options were offered after discussion;: exercise counseling/recommendations and nutrition counseling/recommendations      Physical Exam  Cardiovascular:      Rate and Rhythm: Normal rate.      Pulses: Normal pulses.      Heart sounds: Normal heart sounds.   Pulmonary:      Effort: Pulmonary effort is normal.      Breath sounds: Normal breath sounds.   Skin:     " General: Skin is warm.   Neurological:      Mental Status: She is alert and oriented to person, place, and time.        Result Review :  The following data was reviewed by: Vania Sherwood MD on 11/22/2024:  CMP          2/9/2024    19:37 8/25/2024    04:10 11/20/2024    00:00   CMP   Glucose 108  97  84    BUN 8  8  7    Creatinine 0.74  0.81  0.80    EGFR 111.1  99.7     Sodium 137  135  136    Potassium 3.6  4.0  4.2    Chloride 105  103  107    Calcium 9.0  9.1  9.0    Total Protein   7.1    Total Protein 6.4  6.8     Albumin 4.2  4.1  4.3    Globulin   2.8    Globulin 2.2  2.7     Total Bilirubin 0.3  0.3  0.5    Alkaline Phosphatase 70  67  64    AST (SGOT) 14  14  17    ALT (SGPT) 10  11  11    Albumin/Globulin Ratio 1.9  1.5     BUN/Creatinine Ratio 10.8  9.9  8.8    Anion Gap 6.5  7.5       CBC          2/9/2024    19:36 8/25/2024    04:10 11/20/2024    00:00   CBC   WBC 5.48  6.38  5.34    RBC 3.67  3.91  4.10    Hemoglobin 11.5  12.0  13.0    Hematocrit 34.5  36.4  38.8    MCV 94.0  93.1  94.6    MCH 31.3  30.7  31.7    MCHC 33.3  33.0  33.5    RDW 12.3  12.4  12.1    Platelets 195  208  225      Lipid Panel          12/15/2023    10:40 11/20/2024    00:00   Lipid Panel   Total Cholesterol 159  157    Triglycerides 28  39    HDL Cholesterol 71  65    VLDL Cholesterol 7  9    LDL Cholesterol  81  83    LDL/HDL Ratio  1.30      TSH          12/15/2023    10:40 5/7/2024    11:14 11/20/2024    00:00   TSH   TSH 0.673  0.647  1.320                Assessment and Plan   Diagnoses and all orders for this visit:    1. Annual physical exam (Primary)  -     CBC Auto Differential; Future  -     Comprehensive Metabolic Panel; Future  -     Lipid Panel With / Chol / HDL Ratio; Future  -     TSH; Future  -     Urinalysis With Microscopic - Urine, Clean Catch; Future    2. Other microscopic hematuria  -     Urinalysis With Microscopic - Urine, Clean Catch    3. Other migraine without status migrainosus, not  intractable    4. Rectal discomfort    5. Other fatigue    6. Gastroesophageal reflux disease, unspecified whether esophagitis present        Assessment & Plan  1. Yeast Infection.  The patient is currently receiving treatment for a yeast infection that is resistant to fluconazole. She is advised to continue her current treatment regimen.    2. Bacterial Vaginosis.  She is using a cream for bacterial vaginosis, which may cause mild nausea. She is advised to continue using the cream as prescribed.    3. Trace Hematuria.  Her recent urine test showed trace amounts of blood. She is advised to repeat the urine test in 1 to 2 months, ensuring the test is done a week after her menstrual period ends. She is advised to stay hydrated.    4. Uterine Fibroids.  She has small uterine fibroids, which may cause vaginal bleeding or irregular menstruation but should not cause blood in the urine. No immediate intervention is required.    5. Health Maintenance.  Her Pap smear is up to date. She is due for a tetanus shot and is advised to get the COVID-19 vaccine from her pharmacy.    Follow-up  Return in 1 to 2 months for a repeat urine test.     Patient encouraged to partake of healthy diet rich in fresh fruits and vegetables as well as lean proteins.  Patient encouraged to participate in daily exercise with goal of 30 min sustained activity.  Wear seatbelt when driving  Flu shot annually         Follow Up   No follow-ups on file.  Patient was given instructions and counseling regarding her condition or for health maintenance advice. Please see specific information pulled into the AVS if appropriate.     Patient or patient representative verbalized consent for the use of Ambient Listening during the visit with  Vania Sherwood MD for chart documentation. 12/5/2024  05:57 EST

## 2025-02-06 DIAGNOSIS — K64.8 OTHER HEMORRHOIDS: ICD-10-CM

## 2025-02-06 DIAGNOSIS — K59.09 OTHER CONSTIPATION: Primary | ICD-10-CM

## 2025-02-06 DIAGNOSIS — K62.89 RECTAL DISCOMFORT: ICD-10-CM

## 2025-02-17 ENCOUNTER — OFFICE VISIT (OUTPATIENT)
Dept: SURGERY | Facility: CLINIC | Age: 35
End: 2025-02-17
Payer: COMMERCIAL

## 2025-02-17 VITALS
BODY MASS INDEX: 26.05 KG/M2 | SYSTOLIC BLOOD PRESSURE: 102 MMHG | HEIGHT: 63 IN | OXYGEN SATURATION: 100 % | DIASTOLIC BLOOD PRESSURE: 60 MMHG | WEIGHT: 147 LBS | HEART RATE: 79 BPM

## 2025-02-17 DIAGNOSIS — K60.2 ANAL FISSURE: ICD-10-CM

## 2025-02-17 DIAGNOSIS — K59.04 CHRONIC IDIOPATHIC CONSTIPATION: Primary | ICD-10-CM

## 2025-02-17 PROCEDURE — 46600 DIAGNOSTIC ANOSCOPY SPX: CPT | Performed by: PHYSICIAN ASSISTANT

## 2025-02-17 PROCEDURE — 99203 OFFICE O/P NEW LOW 30 MIN: CPT | Performed by: PHYSICIAN ASSISTANT

## 2025-02-17 NOTE — PROGRESS NOTES
Digna Ibarra is a 34 y.o. female who is seen as a consult at the request of Roe Paulino MD for Constipation and Hemorrhoids.      HPI:  Pt presents today for evaluation of intermittent constipation and rectal pain.   Pt states that she has experienced intermittent constipation since 2023.   Typically has a BM daily with stool consistency ranging from a 1-3 (occasional 4) on the [place] scale.  She denies taking any fiber, stool softeners, or laxatives.     Pt states that she started experiencing rectal pain in early 2024.   Pain initially occurred randomly, but now occurs on the weekly, and almost daily basis.   The pain has been more intense the past 2 weeks and feels like a tearing sensation.   She reports intermittent BRBPR x2 months and last week passed a mixture of blood and mucus.   She denies any rectal spasms.   Tried PrepH and Anusol-HC 2.5% cream in the past, but did not find this helpful.     Not taking any anticoagulation.   No previous anorectal surgeries.   No previous colonoscopy.   FHx unknown.     Past Medical History:   Diagnosis Date    Acid reflux     Anxiety     Asthma     Chest pain     left side    HL (hearing loss)     Migraines        Past Surgical History:   Procedure Laterality Date    WISDOM TOOTH EXTRACTION Bilateral        Social History:   reports that she has never smoked. She has never been exposed to tobacco smoke. She has never used smokeless tobacco. Drug use questions deferred to the physician. She reports that she does not drink alcohol.      Marriage status:     Family History   Adopted: Yes         Current Outpatient Medications:     albuterol sulfate  (90 Base) MCG/ACT inhaler, INHALE 2 PUFFS BY MOUTH EVERY 4 HOURS AS NEEDED FOR WHEEZING FOR SHORTNESS OF BREATH, Disp: , Rfl:     Hydrocortisone, Perianal, (ANUSOL-HC) 2.5 % rectal cream, Insert  into the rectum 2 (Two) Times a Day. DO NOT USE MORE THAN 2 WEEKS CONTINUOUSLY, Disp: 28 g, Rfl: 0     ipratropium-albuterol (DUO-NEB) 0.5-2.5 mg/3 ml nebulizer, USE 1 VIAL IN NEBULIZER EVERY 6 HOURS AS NEEDED FOR WHEEZING FOR SHORTNESS OF BREATH, Disp: , Rfl:     Allergy  Patient has no known allergies.      Vitals:    02/17/25 1438   BP: 102/60   Pulse: 79   SpO2: 100%     Body mass index is 26.04 kg/m².        Physical Exam  Exam conducted with a chaperone present.   Constitutional:       General: She is not in acute distress.     Appearance: She is well-developed.   HENT:      Head: Normocephalic and atraumatic.      Nose: Nose normal.   Eyes:      Conjunctiva/sclera: Conjunctivae normal.      Pupils: Pupils are equal, round, and reactive to light.   Neck:      Trachea: No tracheal deviation.   Pulmonary:      Effort: Pulmonary effort is normal. No respiratory distress.      Breath sounds: Normal breath sounds.   Abdominal:      General: Bowel sounds are normal. There is no distension.      Palpations: Abdomen is soft.   Genitourinary:     Comments: Perianal exam: External hemorrhoids WNL. Posterior anal fissure.   ITALIA- Increased tone, no masses  Anoscopy performed: Internal hemorrhoids WNL. No active bleeding.   Musculoskeletal:         General: No deformity. Normal range of motion.      Cervical back: Normal range of motion.   Skin:     General: Skin is warm and dry.   Neurological:      Mental Status: She is alert and oriented to person, place, and time.      Cranial Nerves: No cranial nerve deficit.      Coordination: Coordination normal.      Gait: Gait normal.   Psychiatric:         Behavior: Behavior normal.         Judgment: Judgment normal.         Review of Medical Record:  I reviewed medical records as detailed in HPI.     Assessment:   1. Chronic idiopathic constipation    2. Anal fissure    - New    Plan:  - Discussed physical exam findings with Pt as well as common causes of anal fissures.  - Discussed that managing her constipation will help the fissure to heal and to reduce risk of future anal  fissures from forming.   - Rx for Diltiazem/Lidocaine compound cream sent to [facility]. Pt instructed to apply externally TID x10 weeks. Will consider chemical sphincterotomy with Botox if symptoms do not improve with conservative management.   - For constipation, recommended starting fiber with a daily goal of 30-40 grams. Printed instructions provided. Miralax PRN. Will consider Linzess if symptoms do not improve with a conservative approach.   - Recommended a colonoscopy for further evaluation of RB. Pt expresses concerns regarding cost. Pt was provided with the number to billing, which she may call to discuss what her out-of-pocket expense would be for that procedure. Pt instructed to call our office if she would like to schedule.   - Follow up in 6-8 weeks for reevaluation

## 2025-04-04 ENCOUNTER — TELEPHONE (OUTPATIENT)
Dept: FAMILY MEDICINE CLINIC | Facility: CLINIC | Age: 35
End: 2025-04-04

## 2025-04-04 NOTE — TELEPHONE ENCOUNTER
PT DROPPED OFF [place] PAPERWORK. SPOKE WITH DR ELI WHO IS GOING TO REVIEW IT AND WILL HAVE [facility] CALL IF THERE IS ANY QUESTIONS OR THEY NEED PT TO COME IN.    THANK YOU

## 2025-04-08 LAB
APPEARANCE UR: CLEAR
BACTERIA #/AREA URNS HPF: NORMAL /[HPF]
BILIRUB UR QL STRIP: NEGATIVE
CASTS URNS QL MICRO: NORMAL /LPF
COLOR UR: YELLOW
EPI CELLS #/AREA URNS HPF: NORMAL /HPF (ref 0–10)
GLUCOSE UR QL STRIP: NEGATIVE
HGB UR QL STRIP: ABNORMAL
KETONES UR QL STRIP: NEGATIVE
LEUKOCYTE ESTERASE UR QL STRIP: NEGATIVE
MICRO URNS: ABNORMAL
NITRITE UR QL STRIP: NEGATIVE
PH UR STRIP: 7 [PH] (ref 5–7.5)
PROT UR QL STRIP: NEGATIVE
RBC #/AREA URNS HPF: NORMAL /HPF (ref 0–2)
SP GR UR STRIP: 1.01 (ref 1–1.03)
UROBILINOGEN UR STRIP-MCNC: 0.2 MG/DL (ref 0.2–1)
WBC #/AREA URNS HPF: NORMAL /HPF (ref 0–5)

## 2025-05-16 ENCOUNTER — OFFICE VISIT (OUTPATIENT)
Dept: FAMILY MEDICINE CLINIC | Facility: CLINIC | Age: 35
End: 2025-05-16
Payer: COMMERCIAL

## 2025-05-16 VITALS
RESPIRATION RATE: 16 BRPM | HEIGHT: 63 IN | SYSTOLIC BLOOD PRESSURE: 110 MMHG | WEIGHT: 134.9 LBS | HEART RATE: 99 BPM | TEMPERATURE: 98.3 F | BODY MASS INDEX: 23.9 KG/M2 | DIASTOLIC BLOOD PRESSURE: 75 MMHG

## 2025-05-16 DIAGNOSIS — Z20.2 POSSIBLE EXPOSURE TO STI: ICD-10-CM

## 2025-05-16 DIAGNOSIS — G43.019 INTRACTABLE MIGRAINE WITHOUT AURA AND WITHOUT STATUS MIGRAINOSUS: ICD-10-CM

## 2025-05-16 DIAGNOSIS — E55.9 VITAMIN D DEFICIENCY: ICD-10-CM

## 2025-05-16 DIAGNOSIS — A64 STI (SEXUALLY TRANSMITTED INFECTION): Primary | ICD-10-CM

## 2025-05-16 PROCEDURE — 99213 OFFICE O/P EST LOW 20 MIN: CPT | Performed by: STUDENT IN AN ORGANIZED HEALTH CARE EDUCATION/TRAINING PROGRAM

## 2025-05-16 RX ORDER — MULTIPLE VITAMINS W/ MINERALS TAB 9MG-400MCG
1 TAB ORAL DAILY
COMMUNITY

## 2025-05-19 NOTE — PROGRESS NOTES
"Chief Complaint  STD SCREENING (PATIENT IS REQUESTING A FULL PANEL OF STD'S, INCLUDING HIV/SYPHILLIS/HEP C, GONNRHEA, CHLAMYDIA, TRICHOMONIS, BV./)    Subjective    History of Present Illness  The patient is a 34-year-old female who presents for evaluation of potential sexually transmitted infection, iron deficiency, and migraines.    She has expressed interest in undergoing comprehensive blood work, including a full STD screening. She reports no specific concerns but wishes to ensure her health status. She is currently in a monogamous relationship with her , who has recently been unfaithful. She reports no vaginal lesions or discharge.    She suspects a recurrence of low iron levels, as evidenced by fatigue and a noticeable lightening in the color of her lips. She reports no blood loss and admits to a decreased appetite over the past 2 months, which she is now attempting to improve. She has been on iron and vitamin D3 supplements for approximately 2 weeks.    Additionally, she reports a resurgence of her migraines. She has submitted [place] paperwork and is awaiting approval from her employer.       Digna Ibarra presents to [facility]  History of Present Illness    Objective   Vital Signs:  /75   Pulse 99   Temp 98.3 °F (36.8 °C) (Oral)   Resp 16   Ht 160 cm (63\")   Wt 61.2 kg (134 lb 14.4 oz)   PF 99 L/min   BMI 23.90 kg/m²   Estimated body mass index is 23.9 kg/m² as calculated from the following:    Height as of this encounter: 160 cm (63\").    Weight as of this encounter: 61.2 kg (134 lb 14.4 oz).    BMI is within normal parameters. No other follow-up for BMI required.      Physical Exam  Cardiovascular:      Rate and Rhythm: Normal rate.      Pulses: Normal pulses.      Heart sounds: Normal heart sounds.   Pulmonary:      Effort: Pulmonary effort is normal.      Breath sounds: Normal breath sounds.   Abdominal:      General: Bowel sounds are normal.      " Palpations: Abdomen is soft.   Skin:     General: Skin is warm.      Findings: No lesion or rash.   Neurological:      Mental Status: She is alert and oriented to person, place, and time.        Result Review :                Assessment and Plan   Diagnoses and all orders for this visit:    1. STI (sexually transmitted infection) (Primary)    2. Possible exposure to STI  -     Hepatitis panel, acute  -     HIV-1/O/2 ANTIGEN/ANTIBODY, 4TH GENERATION  -     RPR  -     HSV 1 and 2-Specific Ab, IgG  -     Chlamydia trachomatis, Neisseria gonorrhoeae, PCR - Urine, Urine, Clean Catch  -     NuSwab VG+ - Swab, Vagina    3. Vitamin D deficiency    4. Intractable migraine without aura and without status migrainosus        Assessment & Plan  1. Potential sexually transmitted infection.  - No specific concerns for any particular STI, but desires a comprehensive screening.  - STI panel to include hepatitis B and C, HIV, syphilis, chlamydia, gonorrhea, and trichomonas.  - Possible exposure to STI noted for insurance coverage.  - Non-fasting lab appointment to be scheduled within a week; patient will be informed of any abnormal results.    2. Iron deficiency.  - Hemoglobin level was 13 during the last check 5 months ago.  - Patient has restarted iron and vitamin D3 supplements approximately 2 weeks ago.  - Advised to continue supplements and monitor symptoms such as dizziness, fatigue, and tiredness.  - If symptoms worsen before the November appointment, patient should inform us for a potential earlier hemoglobin test.    3. Migraines.  - Migraines have recurred; patient has submitted [place] paperwork and is awaiting job approval.  - No additional treatment or medication changes discussed at this time.            Follow Up   No follow-ups on file.  Patient was given instructions and counseling regarding her condition or for health maintenance advice. Please see specific information pulled into the AVS if appropriate.     Patient or  patient representative verbalized consent for the use of Ambient Listening during the visit with  Vania Sherwood MD for chart documentation. 5/19/2025  10:09 EDT

## 2025-05-22 LAB
C TRACH RRNA SPEC QL NAA+PROBE: NEGATIVE
HAV IGM SERPL QL IA: NEGATIVE
HBV CORE IGM SERPL QL IA: NEGATIVE
HBV SURFACE AG SERPL QL IA: NEGATIVE
HCV AB SERPL QL IA: NON REACTIVE
HCV AB SERPL QL IA: NORMAL
HIV 1+2 AB+HIV1 P24 AG SERPL QL IA: NON REACTIVE
HSV1 IGG SERPL QL IA: REACTIVE
HSV2 IGG SERPL QL IA: NON REACTIVE
N GONORRHOEA RRNA SPEC QL NAA+PROBE: NEGATIVE
RPR SER QL: NON REACTIVE

## 2025-07-31 ENCOUNTER — TELEPHONE (OUTPATIENT)
Dept: FAMILY MEDICINE CLINIC | Facility: CLINIC | Age: 35
End: 2025-07-31

## 2025-08-04 ENCOUNTER — TELEPHONE (OUTPATIENT)
Dept: FAMILY MEDICINE CLINIC | Facility: CLINIC | Age: 35
End: 2025-08-04
Payer: COMMERCIAL

## 2025-08-04 ENCOUNTER — APPOINTMENT (OUTPATIENT)
Dept: GENERAL RADIOLOGY | Facility: HOSPITAL | Age: 35
End: 2025-08-04
Payer: COMMERCIAL

## 2025-08-04 ENCOUNTER — HOSPITAL ENCOUNTER (EMERGENCY)
Facility: HOSPITAL | Age: 35
Discharge: HOME | End: 2025-08-04
Attending: EMERGENCY MEDICINE | Admitting: EMERGENCY MEDICINE
Payer: COMMERCIAL

## 2025-08-04 VITALS
WEIGHT: 130 LBS | HEART RATE: 85 BPM | HEIGHT: 63 IN | RESPIRATION RATE: 18 BRPM | SYSTOLIC BLOOD PRESSURE: 100 MMHG | BODY MASS INDEX: 23.04 KG/M2 | TEMPERATURE: 98.5 F | OXYGEN SATURATION: 100 % | DIASTOLIC BLOOD PRESSURE: 73 MMHG

## 2025-08-04 DIAGNOSIS — J45.909 PERSISTENT ASTHMA WITHOUT COMPLICATION, UNSPECIFIED ASTHMA SEVERITY: Primary | ICD-10-CM

## 2025-08-04 DIAGNOSIS — R25.2 MUSCLE CRAMPS: ICD-10-CM

## 2025-08-04 LAB
ALBUMIN SERPL-MCNC: 4 G/DL (ref 3.5–5.2)
ALBUMIN/GLOB SERPL: 1.5 G/DL
ALP SERPL-CCNC: 65 U/L (ref 39–117)
ALT SERPL W P-5'-P-CCNC: 9 U/L (ref 1–33)
ANION GAP SERPL CALCULATED.3IONS-SCNC: 7.5 MMOL/L (ref 5–15)
AST SERPL-CCNC: 14 U/L (ref 1–32)
BASOPHILS # BLD AUTO: 0.01 10*3/MM3 (ref 0–0.2)
BASOPHILS NFR BLD AUTO: 0.2 % (ref 0–1.5)
BILIRUB SERPL-MCNC: 0.4 MG/DL (ref 0–1.2)
BUN SERPL-MCNC: 10.6 MG/DL (ref 6–20)
BUN/CREAT SERPL: 14.1 (ref 7–25)
CALCIUM SPEC-SCNC: 9 MG/DL (ref 8.6–10.5)
CHLORIDE SERPL-SCNC: 108 MMOL/L (ref 98–107)
CO2 SERPL-SCNC: 21.5 MMOL/L (ref 22–29)
CREAT SERPL-MCNC: 0.75 MG/DL (ref 0.57–1)
D DIMER PPP FEU-MCNC: 0.42 MCGFEU/ML (ref 0–0.5)
DEPRECATED RDW RBC AUTO: 43.5 FL (ref 37–54)
EGFRCR SERPLBLD CKD-EPI 2021: 108.6 ML/MIN/1.73
EOSINOPHIL # BLD AUTO: 0.04 10*3/MM3 (ref 0–0.4)
EOSINOPHIL NFR BLD AUTO: 0.8 % (ref 0.3–6.2)
ERYTHROCYTE [DISTWIDTH] IN BLOOD BY AUTOMATED COUNT: 12.3 % (ref 12.3–15.4)
GEN 5 1HR TROPONIN T REFLEX: <6 NG/L
GLOBULIN UR ELPH-MCNC: 2.7 GM/DL
GLUCOSE SERPL-MCNC: 108 MG/DL (ref 65–99)
HCG SERPL QL: NEGATIVE
HCT VFR BLD AUTO: 36.9 % (ref 34–46.6)
HGB BLD-MCNC: 12.2 G/DL (ref 12–15.9)
HOLD SPECIMEN: NORMAL
HOLD SPECIMEN: NORMAL
IMM GRANULOCYTES # BLD AUTO: 0 10*3/MM3 (ref 0–0.05)
IMM GRANULOCYTES NFR BLD AUTO: 0 % (ref 0–0.5)
LYMPHOCYTES # BLD AUTO: 1.29 10*3/MM3 (ref 0.7–3.1)
LYMPHOCYTES NFR BLD AUTO: 25.6 % (ref 19.6–45.3)
MAGNESIUM SERPL-MCNC: 2 MG/DL (ref 1.6–2.6)
MCH RBC QN AUTO: 31.3 PG (ref 26.6–33)
MCHC RBC AUTO-ENTMCNC: 33.1 G/DL (ref 31.5–35.7)
MCV RBC AUTO: 94.6 FL (ref 79–97)
MONOCYTES # BLD AUTO: 0.33 10*3/MM3 (ref 0.1–0.9)
MONOCYTES NFR BLD AUTO: 6.6 % (ref 5–12)
NEUTROPHILS NFR BLD AUTO: 3.36 10*3/MM3 (ref 1.7–7)
NEUTROPHILS NFR BLD AUTO: 66.8 % (ref 42.7–76)
NT-PROBNP SERPL-MCNC: <36 PG/ML (ref 0–450)
PLATELET # BLD AUTO: 209 10*3/MM3 (ref 140–450)
PMV BLD AUTO: 9.8 FL (ref 6–12)
POTASSIUM SERPL-SCNC: 4.3 MMOL/L (ref 3.5–5.2)
PROT SERPL-MCNC: 6.7 G/DL (ref 6–8.5)
QT INTERVAL: 379 MS
QTC INTERVAL: 392 MS
RBC # BLD AUTO: 3.9 10*6/MM3 (ref 3.77–5.28)
SODIUM SERPL-SCNC: 137 MMOL/L (ref 136–145)
TROPONIN T NUMERIC DELTA: NORMAL
TROPONIN T SERPL HS-MCNC: <6 NG/L
WBC NRBC COR # BLD AUTO: 5.03 10*3/MM3 (ref 3.4–10.8)
WHOLE BLOOD HOLD COAG: NORMAL
WHOLE BLOOD HOLD SPECIMEN: NORMAL

## 2025-08-04 PROCEDURE — 99284 EMERGENCY DEPT VISIT MOD MDM: CPT | Performed by: EMERGENCY MEDICINE

## 2025-08-04 PROCEDURE — 83735 ASSAY OF MAGNESIUM: CPT | Performed by: EMERGENCY MEDICINE

## 2025-08-04 PROCEDURE — 85379 FIBRIN DEGRADATION QUANT: CPT | Performed by: EMERGENCY MEDICINE

## 2025-08-04 PROCEDURE — 83880 ASSAY OF NATRIURETIC PEPTIDE: CPT | Performed by: EMERGENCY MEDICINE

## 2025-08-04 PROCEDURE — 25010000002 METHYLPREDNISOLONE PER 125 MG: Performed by: EMERGENCY MEDICINE

## 2025-08-04 PROCEDURE — 85025 COMPLETE CBC W/AUTO DIFF WBC: CPT | Performed by: EMERGENCY MEDICINE

## 2025-08-04 PROCEDURE — 84703 CHORIONIC GONADOTROPIN ASSAY: CPT | Performed by: EMERGENCY MEDICINE

## 2025-08-04 PROCEDURE — 71046 X-RAY EXAM CHEST 2 VIEWS: CPT

## 2025-08-04 PROCEDURE — 93010 ELECTROCARDIOGRAM REPORT: CPT | Performed by: INTERNAL MEDICINE

## 2025-08-04 PROCEDURE — 84484 ASSAY OF TROPONIN QUANT: CPT | Performed by: EMERGENCY MEDICINE

## 2025-08-04 PROCEDURE — 96374 THER/PROPH/DIAG INJ IV PUSH: CPT

## 2025-08-04 PROCEDURE — 80053 COMPREHEN METABOLIC PANEL: CPT | Performed by: EMERGENCY MEDICINE

## 2025-08-04 PROCEDURE — 93005 ELECTROCARDIOGRAM TRACING: CPT | Performed by: EMERGENCY MEDICINE

## 2025-08-04 RX ORDER — METHOCARBAMOL 500 MG/1
1000 TABLET, FILM COATED ORAL 4 TIMES DAILY PRN
Qty: 32 TABLET | Refills: 0 | Status: SHIPPED | OUTPATIENT
Start: 2025-08-04

## 2025-08-04 RX ORDER — SODIUM CHLORIDE 0.9 % (FLUSH) 0.9 %
10 SYRINGE (ML) INJECTION AS NEEDED
Status: DISCONTINUED | OUTPATIENT
Start: 2025-08-04 | End: 2025-08-04 | Stop reason: HOSPADM

## 2025-08-04 RX ORDER — METHYLPREDNISOLONE SODIUM SUCCINATE 125 MG/2ML
125 INJECTION, POWDER, LYOPHILIZED, FOR SOLUTION INTRAMUSCULAR; INTRAVENOUS ONCE
Status: COMPLETED | OUTPATIENT
Start: 2025-08-04 | End: 2025-08-04

## 2025-08-04 RX ORDER — FLUTICASONE PROPIONATE 110 UG/1
2 AEROSOL, METERED RESPIRATORY (INHALATION) 2 TIMES DAILY
Qty: 12 G | Refills: 0 | Status: SHIPPED | OUTPATIENT
Start: 2025-08-04

## 2025-08-04 RX ORDER — ALBUTEROL SULFATE 90 UG/1
2 INHALANT RESPIRATORY (INHALATION) EVERY 4 HOURS PRN
Qty: 6.7 G | Refills: 0 | Status: SHIPPED | OUTPATIENT
Start: 2025-08-04

## 2025-08-04 RX ORDER — IPRATROPIUM BROMIDE AND ALBUTEROL SULFATE 2.5; .5 MG/3ML; MG/3ML
3 SOLUTION RESPIRATORY (INHALATION) ONCE
Status: COMPLETED | OUTPATIENT
Start: 2025-08-04 | End: 2025-08-04

## 2025-08-04 RX ORDER — PREDNISONE 20 MG/1
40 TABLET ORAL DAILY
Qty: 14 TABLET | Refills: 0 | Status: SHIPPED | OUTPATIENT
Start: 2025-08-04 | End: 2025-08-11

## 2025-08-04 RX ORDER — CHLORAL HYDRATE 500 MG
CAPSULE ORAL
COMMUNITY

## 2025-08-04 RX ADMIN — IPRATROPIUM BROMIDE AND ALBUTEROL SULFATE 3 ML: .5; 3 SOLUTION RESPIRATORY (INHALATION) at 10:34

## 2025-08-04 RX ADMIN — METHYLPREDNISOLONE SODIUM SUCCINATE 125 MG: 125 INJECTION, POWDER, FOR SOLUTION INTRAMUSCULAR; INTRAVENOUS at 10:31
